# Patient Record
Sex: FEMALE | Race: OTHER | HISPANIC OR LATINO | Employment: FULL TIME | URBAN - METROPOLITAN AREA
[De-identification: names, ages, dates, MRNs, and addresses within clinical notes are randomized per-mention and may not be internally consistent; named-entity substitution may affect disease eponyms.]

---
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
## 2022-05-23 ENCOUNTER — OFFICE VISIT (OUTPATIENT)
Dept: MEDICAL GROUP | Age: 22
End: 2022-05-23
Payer: COMMERCIAL

## 2022-05-23 ENCOUNTER — HOSPITAL ENCOUNTER (OUTPATIENT)
Facility: MEDICAL CENTER | Age: 22
End: 2022-05-23
Attending: PHYSICIAN ASSISTANT
Payer: COMMERCIAL

## 2022-05-23 VITALS
DIASTOLIC BLOOD PRESSURE: 72 MMHG | OXYGEN SATURATION: 96 % | SYSTOLIC BLOOD PRESSURE: 104 MMHG | TEMPERATURE: 98.8 F | BODY MASS INDEX: 22.34 KG/M2 | WEIGHT: 110.8 LBS | HEART RATE: 104 BPM | RESPIRATION RATE: 16 BRPM | HEIGHT: 59 IN

## 2022-05-23 DIAGNOSIS — Z23 NEED FOR VACCINATION: ICD-10-CM

## 2022-05-23 DIAGNOSIS — N89.8 VAGINAL DISCHARGE: ICD-10-CM

## 2022-05-23 DIAGNOSIS — Z76.89 ESTABLISHING CARE WITH NEW DOCTOR, ENCOUNTER FOR: ICD-10-CM

## 2022-05-23 PROCEDURE — 90471 IMMUNIZATION ADMIN: CPT | Performed by: PHYSICIAN ASSISTANT

## 2022-05-23 PROCEDURE — 87491 CHLMYD TRACH DNA AMP PROBE: CPT

## 2022-05-23 PROCEDURE — 90621 MENB-FHBP VACC 2/3 DOSE IM: CPT | Performed by: PHYSICIAN ASSISTANT

## 2022-05-23 PROCEDURE — 99213 OFFICE O/P EST LOW 20 MIN: CPT | Mod: 25 | Performed by: PHYSICIAN ASSISTANT

## 2022-05-23 PROCEDURE — 87660 TRICHOMONAS VAGIN DIR PROBE: CPT

## 2022-05-23 PROCEDURE — 87591 N.GONORRHOEAE DNA AMP PROB: CPT

## 2022-05-23 PROCEDURE — 87480 CANDIDA DNA DIR PROBE: CPT

## 2022-05-23 PROCEDURE — 87510 GARDNER VAG DNA DIR PROBE: CPT

## 2022-05-23 ASSESSMENT — PATIENT HEALTH QUESTIONNAIRE - PHQ9: CLINICAL INTERPRETATION OF PHQ2 SCORE: 0

## 2022-05-23 NOTE — PROGRESS NOTES
"cc: Establish care and vaginal discharge    Subjective:     HPI  Orquidea Lopez is a 22 y.o. female presenting to establish care.  She has never had a primary care provider.  She is currently in a CNA program.  She has been going to [facility] to get her birth control.    Vaginal discharge  About 6 months ago she started experiencing vaginal discharge and foul-smelling odor.  It took a few months for her to finally get into get evaluated.  [facility] was seen at [facility].  Per patient she was not given any specific diagnosis.  She does know that she was screened for chlamydia/gonorrhea, trichomonas, BV and candidiasis.  She was given Flagyl as well as doxycycline, also azithromycin to cover for gonorrhea.  However again she was not given any definitive diagnosis.  She states that the odor has improved.  She does still note some yellow vaginal discharge, but this has also improved.  End of last menstrual cycle was 5/21.  She is currently sexually active with same partner.  He is not experiencing any symptoms.  Denies dyspareunia, vaginal itching, dysuria, urinary frequency/urgency, rash, lesions, fevers, pelvic pain.      Review of systems:  See above.       Current Outpatient Medications:   •  triamcinolone (KENALOG) 0.1 % lotion, Apply 1 Application topically 2 times a day as needed., Disp: 40 mL, Rfl: 0    Allergies, past medical history, past surgical history, family history, social history reviewed and updated    Objective:     Vitals: /72 (BP Location: Right arm, Patient Position: Sitting, BP Cuff Size: Adult)   Pulse (!) 104   Temp 37.1 °C (98.8 °F) (Temporal)   Resp 16   Ht 1.499 m (4' 11\")   Wt 50.3 kg (110 lb 12.8 oz)   LMP 05/21/2022 (Exact Date)   SpO2 96%   BMI 22.38 kg/m²   General: Alert, pleasant, NAD  HEENT: Normocephalic. Neck supple.  No thyromegaly or masses palpated. No cervical or supraclavicular lymphadenopathy. No carotid bruits   Heart: Regular rate and " rhythm.  S1 and S2 normal.  No murmurs appreciated.  Respiratory: Normal respiratory effort.  Clear to auscultation bilaterally.  Breasts:  Symmetrical, nontender, without masses or nipple discharge.    Pelvic exam: Normal external genitalia including urethral meatus.  Well supported, nontender urethra and bladder.  Mild amount of white/clear discharge in the vagina.  Cervix without significant discharge or lesions.  No cervical motion tenderness.  Uterus of normal size, shape, and consistency.  No adnexal masses or tenderness. Culture preformed.   A chaperone was offered to the patient during today's exam. Chaperone name: Abigail was present.  Skin: Warm, dry, no rashes.  Extremities: No leg edema.  Radial pulses 2+ symmetric  Psych:  Affect/mood is normal, judgement is good, memory is intact, grooming is appropriate.    Assessment/Plan:     Orquidea was seen today for establish care.    Diagnoses and all orders for this visit:    Vaginal discharge  -She has been treated with multiple rounds of antibiotics, without definitive diagnosis per patient.  We will request records.  She does have a mild mount of clear/white discharge, however will await results of culture to treat.  -     VAGINAL PATHOGENS DNA PANEL; Future  -     Chlamydia/GC, PCR (Urine); Future    Establishing care with new doctor, encounter for  -We will request records and review and received.     Need for vaccination  -Immunization given in clinic today  - Meningococcal Vaccine Serogroup B 2-3 Dose (TRUMENBA)    Return in about 6 months (around 11/23/2022) for Annual PX.

## 2022-05-24 DIAGNOSIS — B96.89 BV (BACTERIAL VAGINOSIS): ICD-10-CM

## 2022-05-24 DIAGNOSIS — N89.8 VAGINAL DISCHARGE: ICD-10-CM

## 2022-05-24 DIAGNOSIS — N76.0 BV (BACTERIAL VAGINOSIS): ICD-10-CM

## 2022-05-24 LAB
C TRACH DNA SPEC QL NAA+PROBE: NEGATIVE
CANDIDA DNA VAG QL PROBE+SIG AMP: NEGATIVE
G VAGINALIS DNA VAG QL PROBE+SIG AMP: POSITIVE
N GONORRHOEA DNA SPEC QL NAA+PROBE: NEGATIVE
SPECIMEN SOURCE: NORMAL
T VAGINALIS DNA VAG QL PROBE+SIG AMP: NEGATIVE

## 2022-05-24 RX ORDER — METRONIDAZOLE 500 MG/1
500 TABLET ORAL 2 TIMES DAILY
Qty: 14 TABLET | Refills: 0 | Status: SHIPPED | OUTPATIENT
Start: 2022-05-24 | End: 2022-05-31

## 2022-05-24 NOTE — PROGRESS NOTES
Vaginal panel came back positive for BV.  Will start on Flagyl.  Patient notified.  Prescription sent to the pharmacy.

## 2022-08-21 ENCOUNTER — OFFICE VISIT (OUTPATIENT)
Dept: URGENT CARE | Facility: PHYSICIAN GROUP | Age: 22
End: 2022-08-21
Payer: COMMERCIAL

## 2022-08-21 ENCOUNTER — HOSPITAL ENCOUNTER (OUTPATIENT)
Facility: MEDICAL CENTER | Age: 22
End: 2022-08-21
Attending: INTERNAL MEDICINE
Payer: COMMERCIAL

## 2022-08-21 VITALS
BODY MASS INDEX: 22.18 KG/M2 | HEIGHT: 59 IN | HEART RATE: 111 BPM | WEIGHT: 110 LBS | RESPIRATION RATE: 18 BRPM | SYSTOLIC BLOOD PRESSURE: 112 MMHG | DIASTOLIC BLOOD PRESSURE: 66 MMHG | OXYGEN SATURATION: 97 % | TEMPERATURE: 98 F

## 2022-08-21 DIAGNOSIS — Z32.01 POSITIVE PREGNANCY TEST: ICD-10-CM

## 2022-08-21 DIAGNOSIS — N30.00 ACUTE CYSTITIS WITHOUT HEMATURIA: ICD-10-CM

## 2022-08-21 DIAGNOSIS — R30.0 DYSURIA: ICD-10-CM

## 2022-08-21 DIAGNOSIS — L29.3 GENITAL PRURITUS: ICD-10-CM

## 2022-08-21 LAB
APPEARANCE UR: CLEAR
BILIRUB UR STRIP-MCNC: NORMAL MG/DL
COLOR UR AUTO: YELLOW
GLUCOSE UR STRIP.AUTO-MCNC: NORMAL MG/DL
INT CON NEG: NEGATIVE
INT CON POS: POSITIVE
KETONES UR STRIP.AUTO-MCNC: NORMAL MG/DL
LEUKOCYTE ESTERASE UR QL STRIP.AUTO: NORMAL
NITRITE UR QL STRIP.AUTO: NORMAL
PH UR STRIP.AUTO: 7.5 [PH] (ref 5–8)
POC URINE PREGNANCY TEST: POSITIVE
PROT UR QL STRIP: NORMAL MG/DL
RBC UR QL AUTO: NORMAL
SP GR UR STRIP.AUTO: 1.02
UROBILINOGEN UR STRIP-MCNC: 1 MG/DL

## 2022-08-21 PROCEDURE — 99214 OFFICE O/P EST MOD 30 MIN: CPT | Performed by: INTERNAL MEDICINE

## 2022-08-21 PROCEDURE — 87510 GARDNER VAG DNA DIR PROBE: CPT

## 2022-08-21 PROCEDURE — 87480 CANDIDA DNA DIR PROBE: CPT

## 2022-08-21 PROCEDURE — 81002 URINALYSIS NONAUTO W/O SCOPE: CPT | Performed by: INTERNAL MEDICINE

## 2022-08-21 PROCEDURE — 87591 N.GONORRHOEAE DNA AMP PROB: CPT

## 2022-08-21 PROCEDURE — 87660 TRICHOMONAS VAGIN DIR PROBE: CPT

## 2022-08-21 PROCEDURE — 81025 URINE PREGNANCY TEST: CPT | Performed by: INTERNAL MEDICINE

## 2022-08-21 PROCEDURE — 87086 URINE CULTURE/COLONY COUNT: CPT

## 2022-08-21 PROCEDURE — 87491 CHLMYD TRACH DNA AMP PROBE: CPT

## 2022-08-21 RX ORDER — FLUCONAZOLE 150 MG/1
TABLET ORAL
Qty: 2 TABLET | Refills: 1 | Status: SHIPPED | OUTPATIENT
Start: 2022-08-21 | End: 2022-12-02

## 2022-08-21 RX ORDER — PHENAZOPYRIDINE HYDROCHLORIDE 200 MG/1
200 TABLET, FILM COATED ORAL 3 TIMES DAILY PRN
Qty: 6 TABLET | Refills: 0 | Status: SHIPPED | OUTPATIENT
Start: 2022-08-21 | End: 2022-12-02

## 2022-08-21 RX ORDER — AMOXICILLIN AND CLAVULANATE POTASSIUM 875; 125 MG/1; MG/1
1 TABLET, FILM COATED ORAL 2 TIMES DAILY
Qty: 14 TABLET | Refills: 0 | Status: SHIPPED | OUTPATIENT
Start: 2022-08-21 | End: 2022-08-28

## 2022-08-21 ASSESSMENT — ENCOUNTER SYMPTOMS
FEVER: 0
CHILLS: 0
FLANK PAIN: 0

## 2022-08-21 NOTE — PROGRESS NOTES
Chief Complaint:      HPI:      Orquidea Lopez is a 22 y.o. female with   Vaginal pruritus and whitish to yellowish curd like vaginal discharge since yesterday.  She tried monistat but this caused further irritation resulting and resulted in new and worsened dysuria.    She reports prior BV infection.   No fever or chills.    No flank pain        ROS:   Review of Systems   Constitutional:  Negative for chills and fever.   Genitourinary:  Positive for frequency and urgency. Negative for dysuria, flank pain and hematuria.      Past Medical History:  She There are no problems to display for this patient.    Past Surgical History:  She History reviewed. No pertinent surgical history.   Allergies:  She Patient has no known allergies.   Current Medications:  She   Current Outpatient Medications:     triamcinolone (KENALOG) 0.1 % lotion, Apply 1 Application topically 2 times a day as needed., Disp: 40 mL, Rfl: 0  Social History:  She   Social History     Socioeconomic History    Marital status: Single     Spouse name: Not on file    Number of children: Not on file    Years of education: Not on file    Highest education level: Not on file   Occupational History    Not on file   Tobacco Use    Smoking status: Never    Smokeless tobacco: Never   Vaping Use    Vaping Use: Never used   Substance and Sexual Activity    Alcohol use: Never    Drug use: Never    Sexual activity: Yes     Partners: Male     Birth control/protection: Pill   Other Topics Concern    Not on file   Social History Narrative    Not on file     Social Determinants of Health     Financial Resource Strain: Not on file   Food Insecurity: Not on file   Transportation Needs: Not on file   Physical Activity: Not on file   Stress: Not on file   Social Connections: Not on file   Intimate Partner Violence: Not on file   Housing Stability: Not on file      Family History:   Her   Family History   Problem Relation Age of Onset    Hypertension Father      "    PHYSICAL EXAM:    Vital signs: /66 (BP Location: Right arm, Patient Position: Sitting, BP Cuff Size: Adult)   Pulse (!) 111   Temp 36.7 °C (98 °F) (Temporal)   Resp 18   Ht 1.499 m (4' 11\")   Wt 49.9 kg (110 lb)   LMP 07/18/2022   SpO2 97%   BMI 22.22 kg/m²   Physical Exam  Constitutional:       Appearance: She is normal weight.   HENT:      Mouth/Throat:      Mouth: Mucous membranes are moist.      Pharynx: Oropharynx is clear.   Eyes:      Extraocular Movements: Extraocular movements intact.      Conjunctiva/sclera: Conjunctivae normal.      Pupils: Pupils are equal, round, and reactive to light.   Cardiovascular:      Rate and Rhythm: Normal rate and regular rhythm.   Pulmonary:      Effort: Pulmonary effort is normal.   Abdominal:      General: Abdomen is flat. Bowel sounds are normal.      Tenderness: There is no right CVA tenderness or left CVA tenderness.      Comments: Mild suprapubic tenderness   Musculoskeletal:      Cervical back: Normal range of motion and neck supple.   Neurological:      Mental Status: She is alert.       Labs:  No results found for: HBA1C   No results found for: CHOLSTRLTOT, TRIGLYCERIDE, HDL, LDL, CHOLHDLRAT, NONHDL  No results found for: MICROALBCALC, MALBCRT, MALBEXCR, UMHDQL94, MICROALBUR, MICRALB, UMICROALBUM, MICROALBTIM   No results found for: CREATININE        ASSESSMENT/PLAN:   1. Acute cystitis without hematuria  Point-of-care urinalysis positive with leukocytes  Patient's point-of-care pregnancy test was positive  Due to positive pregnancy test I recommend she take Augmentin for 7 days  Urine culture was obtained we will update patient on test results  Follow-up with primary care    2. Positive pregnancy test  Point-of-care pregnancy test was positive  Advised patient to contact primary care for additional confirmatory testing    3. Genital pruritus  High probability of yeast vaginitis based on history  Patient tried and failed topical medication  Vaginal " pathogen  swab obtained to help confirm etiology of vaginitis  We will update patient on results of vaginal pathogen swab  Will start empiric treatment with fluconazole for only 2 doses  Follow-up with primary care    4. Dysuria  Will start Pyridium      Return if symptoms worsen or fail to improve.         This patient during there office visit was started on new medication.  Side effects of new medications were discussed with the patient today in the office. The patient was supplied paperwork on this new medication.    Red flags discussed and when to RTC or seek care in the ER  Supportive care, differential diagnoses, and indications for immediate follow-up discussed with patient.    Pathogenesis of diagnosis discussed including typical length and natural progression.       Instructed to return to  or nearest emergency department if symptoms fail to improve, for any change in condition, further concerns, or new concerning symptoms.  Patient states understanding of the plan of care and discharge instructions.      Ector Watkins MD, FACE, NU  08/21/22

## 2022-08-22 DIAGNOSIS — B96.89 BACTERIAL VAGINOSIS IN PREGNANCY: ICD-10-CM

## 2022-08-22 DIAGNOSIS — R30.0 DYSURIA: ICD-10-CM

## 2022-08-22 DIAGNOSIS — L29.3 GENITAL PRURITUS: ICD-10-CM

## 2022-08-22 DIAGNOSIS — O23.599 BACTERIAL VAGINOSIS IN PREGNANCY: ICD-10-CM

## 2022-08-22 LAB
C TRACH DNA SPEC QL NAA+PROBE: NEGATIVE
CANDIDA DNA VAG QL PROBE+SIG AMP: POSITIVE
G VAGINALIS DNA VAG QL PROBE+SIG AMP: POSITIVE
N GONORRHOEA DNA SPEC QL NAA+PROBE: NEGATIVE
SPECIMEN SOURCE: NORMAL
T VAGINALIS DNA VAG QL PROBE+SIG AMP: NEGATIVE

## 2022-08-22 RX ORDER — METRONIDAZOLE 500 MG/1
500 TABLET ORAL 2 TIMES DAILY
Qty: 14 TABLET | Refills: 0 | Status: SHIPPED | OUTPATIENT
Start: 2022-08-22 | End: 2022-08-29

## 2022-08-24 LAB
BACTERIA UR CULT: NORMAL
SIGNIFICANT IND 70042: NORMAL
SITE SITE: NORMAL
SOURCE SOURCE: NORMAL

## 2022-08-25 ENCOUNTER — OFFICE VISIT (OUTPATIENT)
Dept: MEDICAL GROUP | Age: 22
End: 2022-08-25
Payer: COMMERCIAL

## 2022-08-25 VITALS
SYSTOLIC BLOOD PRESSURE: 116 MMHG | DIASTOLIC BLOOD PRESSURE: 62 MMHG | WEIGHT: 112 LBS | OXYGEN SATURATION: 99 % | HEIGHT: 59 IN | TEMPERATURE: 99.1 F | HEART RATE: 124 BPM | RESPIRATION RATE: 16 BRPM | BODY MASS INDEX: 22.58 KG/M2

## 2022-08-25 DIAGNOSIS — Z32.01 POSITIVE PREGNANCY TEST: ICD-10-CM

## 2022-08-25 LAB
INT CON NEG: NEGATIVE
INT CON POS: POSITIVE
POC URINE PREGNANCY TEST: POSITIVE

## 2022-08-25 PROCEDURE — 81025 URINE PREGNANCY TEST: CPT | Performed by: PHYSICIAN ASSISTANT

## 2022-08-25 PROCEDURE — 99213 OFFICE O/P EST LOW 20 MIN: CPT | Performed by: PHYSICIAN ASSISTANT

## 2022-08-25 NOTE — PROGRESS NOTES
"cc: Positive pregnancy test    Subjective:     HPI  Orquidea Lopez is a 22 y.o. female presenting with her boyfriend.   She was seen in urgent care 2 days ago.  Was treated for acute cystitis and BV.  Point-of-care hCG was completed which was positive.  Was advised to follow-up here for confirmation.  First day of her last menstrual cycle was 7/16.  Her menstrual cycles are fairly irregular, so it was not abnormal for her to miss a menstrual cycle.  She was noting some breast tenderness prior to going to urgent care, did take a home pregnancy test, that was also positive.  She is currently not taking a prenatal vitamin.  Has cut out caffeine.  Still has breast tenderness.  She does not have an OB.  Denies pelvic cramping, vaginal bleeding, nausea, or vomiting.        Review of systems:  See above.       Current Outpatient Medications:     metroNIDAZOLE (FLAGYL) 500 MG Tab, Take 1 Tablet by mouth 2 times a day for 7 days., Disp: 14 Tablet, Rfl: 0    amoxicillin-clavulanate (AUGMENTIN) 875-125 MG Tab, Take 1 Tablet by mouth 2 times a day for 7 days., Disp: 14 Tablet, Rfl: 0    fluconazole (DIFLUCAN) 150 MG tablet, Take 1 tablet once and may repeat after 72 hours if with persistent discharge, Disp: 2 Tablet, Rfl: 1    phenazopyridine (PYRIDIUM) 200 MG Tab, Take 1 Tablet by mouth 3 times a day as needed for Severe Pain or Moderate Pain., Disp: 6 Tablet, Rfl: 0    triamcinolone (KENALOG) 0.1 % lotion, Apply 1 Application topically 2 times a day as needed., Disp: 40 mL, Rfl: 0    Allergies, past medical history, past surgical history, family history, social history reviewed and updated    Objective:     Vitals: /62 (BP Location: Right arm, Patient Position: Sitting, BP Cuff Size: Adult)   Pulse (!) 124   Temp 37.3 °C (99.1 °F) (Temporal)   Resp 16   Ht 1.499 m (4' 11\")   Wt 50.8 kg (112 lb)   SpO2 99%   BMI 22.62 kg/m²   General: Alert, pleasant, NAD  HEENT: Normocephalic. Neck supple.  No thyromegaly " or masses palpated. No cervical or supraclavicular lymphadenopathy. No carotid bruits   Heart: Regular rate and rhythm.  S1 and S2 normal.  No murmurs appreciated.  Respiratory: Normal respiratory effort.  Clear to auscultation bilaterally.  Skin: Warm, dry, no rashes.  Extremities: No leg edema.  Radial pulses 2+ symmetric  Psych:  Affect/mood is normal, judgement is good, memory is intact, grooming is appropriate.    Assessment/Plan:     Orquidea was seen today for amenorrhea.    Diagnoses and all orders for this visit:    Positive pregnancy test  -Pregnancy test positive in clinic today.  Advised to start a prenatal vitamin and to stop over-the-counter vitamins that she has been taking.  Reviewed proper nutrition.  Continue with physical activity as tolerated.  Referral placed to OB for continued prenatal care.  -     POCT PREGNANCY  -     Referral to OB/Gyn      Return if symptoms worsen or fail to improve.

## 2022-09-28 ENCOUNTER — HOSPITAL ENCOUNTER (OUTPATIENT)
Facility: MEDICAL CENTER | Age: 22
End: 2022-09-28
Attending: OBSTETRICS & GYNECOLOGY
Payer: COMMERCIAL

## 2022-09-28 PROCEDURE — 86787 VARICELLA-ZOSTER ANTIBODY: CPT

## 2022-09-28 PROCEDURE — 87340 HEPATITIS B SURFACE AG IA: CPT

## 2022-09-28 PROCEDURE — 86901 BLOOD TYPING SEROLOGIC RH(D): CPT

## 2022-09-28 PROCEDURE — 86850 RBC ANTIBODY SCREEN: CPT

## 2022-09-28 PROCEDURE — 87086 URINE CULTURE/COLONY COUNT: CPT

## 2022-09-28 PROCEDURE — 86762 RUBELLA ANTIBODY: CPT

## 2022-09-28 PROCEDURE — 86780 TREPONEMA PALLIDUM: CPT

## 2022-09-28 PROCEDURE — 86900 BLOOD TYPING SEROLOGIC ABO: CPT

## 2022-09-28 PROCEDURE — 82306 VITAMIN D 25 HYDROXY: CPT

## 2022-09-28 PROCEDURE — 86803 HEPATITIS C AB TEST: CPT

## 2022-09-28 PROCEDURE — 85025 COMPLETE CBC W/AUTO DIFF WBC: CPT

## 2022-09-28 PROCEDURE — 86592 SYPHILIS TEST NON-TREP QUAL: CPT

## 2022-09-28 PROCEDURE — 87389 HIV-1 AG W/HIV-1&-2 AB AG IA: CPT

## 2022-09-29 LAB
25(OH)D3 SERPL-MCNC: 28 NG/ML (ref 30–100)
ABO GROUP BLD: NORMAL
BASOPHILS # BLD AUTO: 0.7 % (ref 0–1.8)
BASOPHILS # BLD: 0.06 K/UL (ref 0–0.12)
BLD GP AB SCN SERPL QL: NORMAL
EOSINOPHIL # BLD AUTO: 0.02 K/UL (ref 0–0.51)
EOSINOPHIL NFR BLD: 0.2 % (ref 0–6.9)
ERYTHROCYTE [DISTWIDTH] IN BLOOD BY AUTOMATED COUNT: 47.7 FL (ref 35.9–50)
HBV SURFACE AG SER QL: ABNORMAL
HCT VFR BLD AUTO: 42.7 % (ref 37–47)
HCV AB SER QL: NORMAL
HGB BLD-MCNC: 14.5 G/DL (ref 12–16)
HIV 1+2 AB+HIV1 P24 AG SERPL QL IA: NORMAL
IMM GRANULOCYTES # BLD AUTO: 0.03 K/UL (ref 0–0.11)
IMM GRANULOCYTES NFR BLD AUTO: 0.4 % (ref 0–0.9)
LYMPHOCYTES # BLD AUTO: 1.85 K/UL (ref 1–4.8)
LYMPHOCYTES NFR BLD: 21.7 % (ref 22–41)
MCH RBC QN AUTO: 32.6 PG (ref 27–33)
MCHC RBC AUTO-ENTMCNC: 34 G/DL (ref 33.6–35)
MCV RBC AUTO: 96 FL (ref 81.4–97.8)
MONOCYTES # BLD AUTO: 0.65 K/UL (ref 0–0.85)
MONOCYTES NFR BLD AUTO: 7.6 % (ref 0–13.4)
NEUTROPHILS # BLD AUTO: 5.91 K/UL (ref 2–7.15)
NEUTROPHILS NFR BLD: 69.4 % (ref 44–72)
NRBC # BLD AUTO: 0 K/UL
NRBC BLD-RTO: 0 /100 WBC
PLATELET # BLD AUTO: 307 K/UL (ref 164–446)
PMV BLD AUTO: 10.3 FL (ref 9–12.9)
RBC # BLD AUTO: 4.45 M/UL (ref 4.2–5.4)
RH BLD: NORMAL
RUBV AB SER QL: >500 IU/ML
T PALLIDUM AB SER QL IA: ABNORMAL
WBC # BLD AUTO: 8.5 K/UL (ref 4.8–10.8)

## 2022-09-30 LAB — VZV IGG SER IA-ACNC: 45.5 IV

## 2022-10-01 LAB
BACTERIA UR CULT: NORMAL
SIGNIFICANT IND 70042: NORMAL
SITE SITE: NORMAL
SOURCE SOURCE: NORMAL

## 2022-10-11 ENCOUNTER — IMMUNIZATION (OUTPATIENT)
Dept: OCCUPATIONAL MEDICINE | Facility: CLINIC | Age: 22
End: 2022-10-11

## 2022-10-11 DIAGNOSIS — Z23 NEED FOR VACCINATION: Primary | ICD-10-CM

## 2022-10-11 PROCEDURE — 90686 IIV4 VACC NO PRSV 0.5 ML IM: CPT | Performed by: PREVENTIVE MEDICINE

## 2022-11-23 ENCOUNTER — HOSPITAL ENCOUNTER (OUTPATIENT)
Facility: MEDICAL CENTER | Age: 22
End: 2022-11-23
Attending: OBSTETRICS & GYNECOLOGY
Payer: COMMERCIAL

## 2022-11-23 PROCEDURE — 81511 FTL CGEN ABNOR FOUR ANAL: CPT

## 2022-11-23 PROCEDURE — 82105 ALPHA-FETOPROTEIN SERUM: CPT

## 2022-11-26 LAB
# FETUSES US: NORMAL
AFP MOM SERPL: 0.96
AFP SERPL-MCNC: 48 NG/ML
AGE - REPORTED: 21 YR
CURRENT SMOKER: NORMAL
FAMILY MEMBER DISEASES HX: NO
GA METHOD: NORMAL
GA: NORMAL WK
IDDM PATIENT QL: NO
INTEGRATED SCN PATIENT-IMP: NORMAL
SPECIMEN DRAWN SERPL: NORMAL

## 2022-11-28 ENCOUNTER — HOSPITAL ENCOUNTER (OUTPATIENT)
Dept: LAB | Facility: MEDICAL CENTER | Age: 22
End: 2022-11-28
Attending: OBSTETRICS & GYNECOLOGY
Payer: COMMERCIAL

## 2022-11-29 LAB
# FETUSES US: NORMAL
AFP MOM SERPL: 0.96
AFP SERPL-MCNC: 48 NG/ML
AGE - REPORTED: 21 YR
CURRENT SMOKER: NO
FAMILY MEMBER DISEASES HX: NO
GA METHOD: NORMAL
GA: NORMAL WK
HCG MOM SERPL: 1.33
HCG SERPL-ACNC: NORMAL IU/L
HX OF HEREDITARY DISORDERS: NO
IDDM PATIENT QL: NO
INHIBIN A MOM SERPL: 0.87
INHIBIN A SERPL-MCNC: 151 PG/ML
INTEGRATED SCN PATIENT-IMP: NORMAL
PATHOLOGY STUDY: NORMAL
SPECIMEN DRAWN SERPL: NORMAL
U ESTRIOL MOM SERPL: 1.56
U ESTRIOL SERPL-MCNC: 2.48 NG/ML

## 2022-12-02 ENCOUNTER — OFFICE VISIT (OUTPATIENT)
Dept: MEDICAL GROUP | Age: 22
End: 2022-12-02
Payer: COMMERCIAL

## 2022-12-02 VITALS
WEIGHT: 117 LBS | HEART RATE: 100 BPM | OXYGEN SATURATION: 98 % | BODY MASS INDEX: 23.59 KG/M2 | HEIGHT: 59 IN | TEMPERATURE: 98 F | SYSTOLIC BLOOD PRESSURE: 102 MMHG | DIASTOLIC BLOOD PRESSURE: 58 MMHG | RESPIRATION RATE: 16 BRPM

## 2022-12-02 DIAGNOSIS — Z00.00 WELL ADULT EXAM: ICD-10-CM

## 2022-12-02 DIAGNOSIS — Z3A.18 18 WEEKS GESTATION OF PREGNANCY: ICD-10-CM

## 2022-12-02 PROCEDURE — 99395 PREV VISIT EST AGE 18-39: CPT | Performed by: PHYSICIAN ASSISTANT

## 2022-12-02 NOTE — PROGRESS NOTES
Subjective:     CC:   Chief Complaint   Patient presents with    Annual Exam       HPI:   Orquidea Lopez is a 22 y.o. female who presents for annual exam    Patient has GYN provider: Yes  Last Pap Smear: never   Last Tdap:   Received HPV series: Yes    Exercise: Walks daily  Diet: Improving trying to cook meals at home more frequently    Patient's last menstrual period was 2022.  Currently 18 weeks pregnant  No significant bloating/fluid retention, pelvic pain, or dyspareunia. No abnormal vaginal discharge.  No breast tenderness, mass, nipple discharge, changes in size or contour, or abnormal cyclic discomfort.    OB History    Para Term  AB Living   1 0 0 0 0 0   SAB IAB Ectopic Molar Multiple Live Births   0 0 0 0 0 0      She  reports being sexually active and has had partner(s) who are male. She reports using the following method of birth control/protection: Pill.    She  has no past medical history on file.  She  has no past surgical history on file.    Family History   Problem Relation Age of Onset    Hypertension Father      Social History     Tobacco Use    Smoking status: Never    Smokeless tobacco: Never   Vaping Use    Vaping Use: Never used   Substance Use Topics    Alcohol use: Never    Drug use: Never       There are no problems to display for this patient.    Current Outpatient Medications   Medication Sig Dispense Refill    triamcinolone (KENALOG) 0.1 % lotion Apply 1 Application topically 2 times a day as needed. 40 mL 0     No current facility-administered medications for this visit.     No Known Allergies    Review of Systems l  Constitutional: Negative for fever, chills and malaise/fatigue.   HENT: Negative for congestion.    Eyes: Negative for pain.   Respiratory: Negative for cough and shortness of breath.    Cardiovascular: Negative for chest pain and leg swelling.   Gastrointestinal: Negative for nausea, vomiting, abdominal pain and diarrhea.   Genitourinary:  "Negative for dysuria and hematuria.   Skin: Negative for rash.   Neurological: Negative for dizziness, focal weakness and headaches.   Endo/Heme/Allergies: Does not bruise/bleed easily.   Psychiatric/Behavioral: Negative for depression.  The patient is not nervous/anxious.      Objective:   /58 (BP Location: Right arm, Patient Position: Sitting, BP Cuff Size: Adult)   Pulse 100   Temp 36.7 °C (98 °F) (Temporal)   Resp 16   Ht 1.499 m (4' 11\")   Wt 53.1 kg (117 lb)   LMP 07/18/2022   SpO2 98%   BMI 23.63 kg/m²     Wt Readings from Last 4 Encounters:   12/02/22 53.1 kg (117 lb)   08/25/22 50.8 kg (112 lb)   08/21/22 49.9 kg (110 lb)   05/23/22 50.3 kg (110 lb 12.8 oz)       Physical Exam:  Constitutional: Well-developed and well-nourished. Not diaphoretic. No distress.   Skin: Skin is warm and dry. No rash noted.  Head: Atraumatic without lesions.  Eyes: Conjunctivae and extraocular motions are normal. Pupils are equal, round, and reactive to light. No scleral icterus.   Ears:  External ears unremarkable. Tympanic membranes clear and intact.  Nose: Nares patent. Septum midline. Turbinates without erythema nor edema. No discharge.   Mouth/Throat: Tongue normal. Oropharynx is clear and moist. Posterior pharynx without erythema or exudates.  Neck: Supple, trachea midline. Normal range of motion. No thyromegaly present. No lymphadenopathy--cervical or supraclavicular.  Cardiovascular: Regular rate and rhythm, S1 and S2 without murmur, rubs, or gallops.    Respiratory: Effort normal. Clear to auscultation throughout. No adventitious sounds.     Abdomen: Soft, non tender, and without distention. Active bowel sounds in all four quadrants. No rebound, guarding, masses or HSM.    Extremities: No cyanosis, clubbing, erythema, nor edema. Distal pulses intact and symmetric.   Musculoskeletal: All major joints AROM full in all directions without pain.  Neurological: Alert and oriented x 3. Grossly non-focal. Strength " and sensation grossly intact. DTRs 2+/3 and symmetric.   Psychiatric:  Behavior, mood, and affect are appropriate.    Assessment and Plan:     1. Well adult exam  Continue with regular physical activity and good control of diet.    2. 18 weeks gestation of pregnancy  Continue with prenatal vitamin.  Follow-up with OB for continued prenatal care    Health maintenance:     Labs per orders  Immunizations per orders  Patient counseled about skin care, diet, supplements, and exercise.  Discussed  breast self exam, diet and exercise     Follow-up: Return in about 1 year (around 12/2/2023) for Annual PX.

## 2023-02-08 ENCOUNTER — HOSPITAL ENCOUNTER (OUTPATIENT)
Dept: LAB | Facility: MEDICAL CENTER | Age: 23
End: 2023-02-08
Attending: NURSE PRACTITIONER

## 2023-02-08 ENCOUNTER — HOSPITAL ENCOUNTER (OUTPATIENT)
Facility: MEDICAL CENTER | Age: 23
End: 2023-02-08
Attending: NURSE PRACTITIONER
Payer: COMMERCIAL

## 2023-02-08 LAB
25(OH)D3 SERPL-MCNC: 45 NG/ML (ref 30–100)
BASOPHILS # BLD AUTO: 0.3 % (ref 0–1.8)
BASOPHILS # BLD: 0.03 K/UL (ref 0–0.12)
EOSINOPHIL # BLD AUTO: 0.11 K/UL (ref 0–0.51)
EOSINOPHIL NFR BLD: 1 % (ref 0–6.9)
ERYTHROCYTE [DISTWIDTH] IN BLOOD BY AUTOMATED COUNT: 43.1 FL (ref 35.9–50)
GLUCOSE 1H P 50 G GLC PO SERPL-MCNC: 100 MG/DL (ref 70–139)
HCT VFR BLD AUTO: 33.9 % (ref 37–47)
HGB BLD-MCNC: 11 G/DL (ref 12–16)
IMM GRANULOCYTES # BLD AUTO: 0.06 K/UL (ref 0–0.11)
IMM GRANULOCYTES NFR BLD AUTO: 0.6 % (ref 0–0.9)
LYMPHOCYTES # BLD AUTO: 1.53 K/UL (ref 1–4.8)
LYMPHOCYTES NFR BLD: 14.6 % (ref 22–41)
MCH RBC QN AUTO: 30.9 PG (ref 27–33)
MCHC RBC AUTO-ENTMCNC: 32.4 G/DL (ref 33.6–35)
MCV RBC AUTO: 95.2 FL (ref 81.4–97.8)
MONOCYTES # BLD AUTO: 0.82 K/UL (ref 0–0.85)
MONOCYTES NFR BLD AUTO: 7.8 % (ref 0–13.4)
NEUTROPHILS # BLD AUTO: 7.96 K/UL (ref 2–7.15)
NEUTROPHILS NFR BLD: 75.7 % (ref 44–72)
NRBC # BLD AUTO: 0 K/UL
NRBC BLD-RTO: 0 /100 WBC
PLATELET # BLD AUTO: 325 K/UL (ref 164–446)
PMV BLD AUTO: 10 FL (ref 9–12.9)
RBC # BLD AUTO: 3.56 M/UL (ref 4.2–5.4)
WBC # BLD AUTO: 10.5 K/UL (ref 4.8–10.8)

## 2023-02-08 PROCEDURE — 82306 VITAMIN D 25 HYDROXY: CPT

## 2023-02-08 PROCEDURE — 82950 GLUCOSE TEST: CPT

## 2023-02-08 PROCEDURE — 86780 TREPONEMA PALLIDUM: CPT

## 2023-02-08 PROCEDURE — 86592 SYPHILIS TEST NON-TREP QUAL: CPT

## 2023-02-08 PROCEDURE — 85025 COMPLETE CBC W/AUTO DIFF WBC: CPT

## 2023-02-10 LAB — RPR SER QL: NON REACTIVE

## 2023-02-13 LAB — T PALLIDUM AB SER QL AGGL: NON REACTIVE

## 2023-04-12 LAB — GP B STREP DNA SPEC QL NAA+PROBE: POSITIVE

## 2023-04-24 ENCOUNTER — HOSPITAL ENCOUNTER (INPATIENT)
Facility: MEDICAL CENTER | Age: 23
LOS: 2 days | End: 2023-04-26
Attending: OBSTETRICS & GYNECOLOGY | Admitting: OBSTETRICS & GYNECOLOGY
Payer: COMMERCIAL

## 2023-04-24 ENCOUNTER — HOSPITAL ENCOUNTER (EMERGENCY)
Facility: MEDICAL CENTER | Age: 23
End: 2023-04-24
Attending: OBSTETRICS & GYNECOLOGY | Admitting: OBSTETRICS & GYNECOLOGY
Payer: COMMERCIAL

## 2023-04-24 VITALS
HEART RATE: 88 BPM | BODY MASS INDEX: 28.47 KG/M2 | HEIGHT: 60 IN | SYSTOLIC BLOOD PRESSURE: 124 MMHG | WEIGHT: 145 LBS | DIASTOLIC BLOOD PRESSURE: 73 MMHG | RESPIRATION RATE: 14 BRPM

## 2023-04-24 DIAGNOSIS — Z78.9 BREASTFEEDING (INFANT): ICD-10-CM

## 2023-04-24 LAB
ERYTHROCYTE [DISTWIDTH] IN BLOOD BY AUTOMATED COUNT: 55.1 FL (ref 35.9–50)
HCT VFR BLD AUTO: 41.3 % (ref 37–47)
HGB BLD-MCNC: 14.1 G/DL (ref 12–16)
HOLDING TUBE BB 8507: NORMAL
MCH RBC QN AUTO: 31.1 PG (ref 27–33)
MCHC RBC AUTO-ENTMCNC: 34.1 G/DL (ref 33.6–35)
MCV RBC AUTO: 91 FL (ref 81.4–97.8)
PLATELET # BLD AUTO: 253 K/UL (ref 164–446)
PMV BLD AUTO: 10.5 FL (ref 9–12.9)
RBC # BLD AUTO: 4.54 M/UL (ref 4.2–5.4)
T PALLIDUM AB SER QL IA: NORMAL
WBC # BLD AUTO: 15.4 K/UL (ref 4.8–10.8)

## 2023-04-24 PROCEDURE — 10907ZC DRAINAGE OF AMNIOTIC FLUID, THERAPEUTIC FROM PRODUCTS OF CONCEPTION, VIA NATURAL OR ARTIFICIAL OPENING: ICD-10-PCS | Performed by: OBSTETRICS & GYNECOLOGY

## 2023-04-24 PROCEDURE — 700111 HCHG RX REV CODE 636 W/ 250 OVERRIDE (IP)

## 2023-04-24 PROCEDURE — 700111 HCHG RX REV CODE 636 W/ 250 OVERRIDE (IP): Performed by: OBSTETRICS & GYNECOLOGY

## 2023-04-24 PROCEDURE — 86780 TREPONEMA PALLIDUM: CPT

## 2023-04-24 PROCEDURE — 302449 STATCHG TRIAGE ONLY (STATISTIC)

## 2023-04-24 PROCEDURE — 85027 COMPLETE CBC AUTOMATED: CPT

## 2023-04-24 PROCEDURE — 36415 COLL VENOUS BLD VENIPUNCTURE: CPT

## 2023-04-24 PROCEDURE — 0KQM0ZZ REPAIR PERINEUM MUSCLE, OPEN APPROACH: ICD-10-PCS | Performed by: OBSTETRICS & GYNECOLOGY

## 2023-04-24 PROCEDURE — 59025 FETAL NON-STRESS TEST: CPT

## 2023-04-24 PROCEDURE — 99282 EMERGENCY DEPT VISIT SF MDM: CPT

## 2023-04-24 PROCEDURE — 700105 HCHG RX REV CODE 258: Performed by: OBSTETRICS & GYNECOLOGY

## 2023-04-24 PROCEDURE — 770002 HCHG ROOM/CARE - OB PRIVATE (112)

## 2023-04-24 RX ORDER — ONDANSETRON 2 MG/ML
4 INJECTION INTRAMUSCULAR; INTRAVENOUS EVERY 6 HOURS PRN
Status: DISCONTINUED | OUTPATIENT
Start: 2023-04-24 | End: 2023-04-25 | Stop reason: HOSPADM

## 2023-04-24 RX ORDER — ONDANSETRON 4 MG/1
4 TABLET, ORALLY DISINTEGRATING ORAL EVERY 6 HOURS PRN
Status: DISCONTINUED | OUTPATIENT
Start: 2023-04-24 | End: 2023-04-25 | Stop reason: HOSPADM

## 2023-04-24 RX ORDER — IBUPROFEN 800 MG/1
800 TABLET ORAL
Status: DISCONTINUED | OUTPATIENT
Start: 2023-04-24 | End: 2023-04-25 | Stop reason: HOSPADM

## 2023-04-24 RX ORDER — LIDOCAINE HYDROCHLORIDE 10 MG/ML
20 INJECTION, SOLUTION INFILTRATION; PERINEURAL
Status: COMPLETED | OUTPATIENT
Start: 2023-04-24 | End: 2023-04-25

## 2023-04-24 RX ORDER — OXYTOCIN 10 [USP'U]/ML
10 INJECTION, SOLUTION INTRAMUSCULAR; INTRAVENOUS
Status: DISCONTINUED | OUTPATIENT
Start: 2023-04-24 | End: 2023-04-25 | Stop reason: HOSPADM

## 2023-04-24 RX ORDER — ONDANSETRON 2 MG/ML
INJECTION INTRAMUSCULAR; INTRAVENOUS
Status: COMPLETED
Start: 2023-04-24 | End: 2023-04-24

## 2023-04-24 RX ORDER — SODIUM CHLORIDE 9 MG/ML
INJECTION, SOLUTION INTRAVENOUS
Status: ACTIVE
Start: 2023-04-24 | End: 2023-04-25

## 2023-04-24 RX ORDER — ACETAMINOPHEN 500 MG
1000 TABLET ORAL
Status: DISCONTINUED | OUTPATIENT
Start: 2023-04-24 | End: 2023-04-25 | Stop reason: HOSPADM

## 2023-04-24 RX ORDER — ROPIVACAINE HYDROCHLORIDE 2 MG/ML
INJECTION, SOLUTION EPIDURAL; INFILTRATION; PERINEURAL
Status: DISCONTINUED
Start: 2023-04-24 | End: 2023-04-25 | Stop reason: ALTCHOICE

## 2023-04-24 RX ORDER — SODIUM CHLORIDE, SODIUM LACTATE, POTASSIUM CHLORIDE, CALCIUM CHLORIDE 600; 310; 30; 20 MG/100ML; MG/100ML; MG/100ML; MG/100ML
INJECTION, SOLUTION INTRAVENOUS CONTINUOUS
Status: DISCONTINUED | OUTPATIENT
Start: 2023-04-24 | End: 2023-04-26 | Stop reason: HOSPADM

## 2023-04-24 RX ORDER — PENICILLIN G POTASSIUM 5000000 [IU]/1
INJECTION, POWDER, FOR SOLUTION INTRAMUSCULAR; INTRAVENOUS
Status: DISCONTINUED
Start: 2023-04-24 | End: 2023-04-25 | Stop reason: ALTCHOICE

## 2023-04-24 RX ORDER — TERBUTALINE SULFATE 1 MG/ML
0.25 INJECTION, SOLUTION SUBCUTANEOUS
Status: DISCONTINUED | OUTPATIENT
Start: 2023-04-24 | End: 2023-04-25 | Stop reason: HOSPADM

## 2023-04-24 RX ADMIN — ONDANSETRON 4 MG: 2 INJECTION INTRAMUSCULAR; INTRAVENOUS at 17:32

## 2023-04-24 RX ADMIN — FENTANYL CITRATE 100 MCG: 50 INJECTION, SOLUTION INTRAMUSCULAR; INTRAVENOUS at 17:28

## 2023-04-24 RX ADMIN — SODIUM CHLORIDE, POTASSIUM CHLORIDE, SODIUM LACTATE AND CALCIUM CHLORIDE: 600; 310; 30; 20 INJECTION, SOLUTION INTRAVENOUS at 17:55

## 2023-04-24 RX ADMIN — ONDANSETRON HYDROCHLORIDE 4 MG: 2 SOLUTION INTRAMUSCULAR; INTRAVENOUS at 17:32

## 2023-04-24 RX ADMIN — SODIUM CHLORIDE 5 MILLION UNITS: 900 INJECTION INTRAVENOUS at 17:30

## 2023-04-24 ASSESSMENT — LIFESTYLE VARIABLES
TOTAL SCORE: 0
ALCOHOL_USE: NO
TOTAL SCORE: 0
TOTAL SCORE: 0
CONSUMPTION TOTAL: NEGATIVE
AVERAGE NUMBER OF DAYS PER WEEK YOU HAVE A DRINK CONTAINING ALCOHOL: 0
HAVE YOU EVER FELT YOU SHOULD CUT DOWN ON YOUR DRINKING: NO
EVER_SMOKED: NEVER
ON A TYPICAL DAY WHEN YOU DRINK ALCOHOL HOW MANY DRINKS DO YOU HAVE: 0
HAVE PEOPLE ANNOYED YOU BY CRITICIZING YOUR DRINKING: NO
HOW MANY TIMES IN THE PAST YEAR HAVE YOU HAD 5 OR MORE DRINKS IN A DAY: 0
EVER HAD A DRINK FIRST THING IN THE MORNING TO STEADY YOUR NERVES TO GET RID OF A HANGOVER: NO
EVER FELT BAD OR GUILTY ABOUT YOUR DRINKING: NO

## 2023-04-24 ASSESSMENT — PATIENT HEALTH QUESTIONNAIRE - PHQ9
SUM OF ALL RESPONSES TO PHQ9 QUESTIONS 1 AND 2: 0
2. FEELING DOWN, DEPRESSED, IRRITABLE, OR HOPELESS: NOT AT ALL
1. LITTLE INTEREST OR PLEASURE IN DOING THINGS: NOT AT ALL

## 2023-04-24 ASSESSMENT — PAIN DESCRIPTION - PAIN TYPE: TYPE: ACUTE PAIN

## 2023-04-24 NOTE — ED PROVIDER NOTES
DATE OF ADMISSION:  2023     OB/GYN OBSERVATION HISTORY AND PHYSICAL     IDENTIFICATION:  This is a 23-year-old  1, para 0 with an EDC of   2023 and EGA of 39 and 2/7 weeks, who presents with chief complaint of   contractions.     HISTORY OF PRESENT ILLNESS:  The patient of Dr. Lala, who has gotten   good prenatal care.  Prenatal care has been uncomplicated.  She is known beta   strep positive.  She presents today complaining of irregular uterine   contractions.  Her last exam in the office, she was found to be 3 cm.  Here in   labor and delivery, she presents complaining of uterine contractions.  Denies   spontaneous rupture of membranes, vaginal bleeding.  Admits good fetal   movement.  Denies symptoms of PIH.  Her cervix is 3, 60, -3 after serial exam   is unchanged.  She does have followup with Dr. Lala on Wednesday and   would like to go home at this time.     ASSESSMENT:  At this time:  1.  Pregnancy at 39 and 2/7th weeks.  2.  Abdominal pain, rule out labor.  3.  Fetal status reassuring.  4.  Serial cervical exam is unchanged.  5.  Known beta strep positive.     PLAN:  At this time;  1.  Discharge home.  2.  Follow up with Dr. Lala on Wednesday.  3.  Kick counts, labor precautions, spontaneous rupture of membranes   precautions.  She will come back to labor and delivery with change in her   status or fetal status.        ______________________________  MD KARLO Cleary/JUSTYNA    DD:  2023 09:41  DT:  2023 10:21    Job#:  [id number]

## 2023-04-24 NOTE — PROGRESS NOTES
0735- pt came to L&D c/o ctx this morning. Pt placed on monitor x 2. Pt denies bleeding or leaking. FM + SVE performed 3/60/-3. Pt stated she was a 3 in the office 2 weeks ago. Will monitor and recheck in 1 hour.    0840- SVE 3/60/-3 cervix is unchanged. Will report to dr. Bourne. Pt said she feels comfortable going home. Labor precautions reviewed.    0930- dr bourne at bedside to assess pt. Said pt may be d/c home with labor precautions. D/c instructions explained. Pt v/u understanding and has no concerns.

## 2023-04-25 LAB
ERYTHROCYTE [DISTWIDTH] IN BLOOD BY AUTOMATED COUNT: 57.3 FL (ref 35.9–50)
HCT VFR BLD AUTO: 36.8 % (ref 37–47)
HGB BLD-MCNC: 12.5 G/DL (ref 12–16)
MCH RBC QN AUTO: 31.4 PG (ref 27–33)
MCHC RBC AUTO-ENTMCNC: 34 G/DL (ref 33.6–35)
MCV RBC AUTO: 92.5 FL (ref 81.4–97.8)
PLATELET # BLD AUTO: 202 K/UL (ref 164–446)
PMV BLD AUTO: 10.8 FL (ref 9–12.9)
RBC # BLD AUTO: 3.98 M/UL (ref 4.2–5.4)
WBC # BLD AUTO: 19.5 K/UL (ref 4.8–10.8)

## 2023-04-25 PROCEDURE — 36415 COLL VENOUS BLD VENIPUNCTURE: CPT

## 2023-04-25 PROCEDURE — A9270 NON-COVERED ITEM OR SERVICE: HCPCS | Performed by: OBSTETRICS & GYNECOLOGY

## 2023-04-25 PROCEDURE — 59409 OBSTETRICAL CARE: CPT

## 2023-04-25 PROCEDURE — 304965 HCHG RECOVERY SERVICES

## 2023-04-25 PROCEDURE — 700102 HCHG RX REV CODE 250 W/ 637 OVERRIDE(OP): Performed by: OBSTETRICS & GYNECOLOGY

## 2023-04-25 PROCEDURE — 302131 K PAD MOTOR: Performed by: OBSTETRICS & GYNECOLOGY

## 2023-04-25 PROCEDURE — 770002 HCHG ROOM/CARE - OB PRIVATE (112)

## 2023-04-25 PROCEDURE — 700111 HCHG RX REV CODE 636 W/ 250 OVERRIDE (IP): Performed by: OBSTETRICS & GYNECOLOGY

## 2023-04-25 PROCEDURE — 302152 K-PAD 12X17: Performed by: OBSTETRICS & GYNECOLOGY

## 2023-04-25 PROCEDURE — 700105 HCHG RX REV CODE 258: Performed by: OBSTETRICS & GYNECOLOGY

## 2023-04-25 PROCEDURE — 700101 HCHG RX REV CODE 250: Performed by: OBSTETRICS & GYNECOLOGY

## 2023-04-25 PROCEDURE — 85027 COMPLETE CBC AUTOMATED: CPT

## 2023-04-25 RX ORDER — IBUPROFEN 800 MG/1
800 TABLET ORAL EVERY 8 HOURS PRN
Status: DISCONTINUED | OUTPATIENT
Start: 2023-04-25 | End: 2023-04-26 | Stop reason: HOSPADM

## 2023-04-25 RX ORDER — ACETAMINOPHEN 500 MG
1000 TABLET ORAL EVERY 6 HOURS PRN
Status: DISCONTINUED | OUTPATIENT
Start: 2023-04-25 | End: 2023-04-26 | Stop reason: HOSPADM

## 2023-04-25 RX ORDER — BISACODYL 10 MG
10 SUPPOSITORY, RECTAL RECTAL PRN
Status: DISCONTINUED | OUTPATIENT
Start: 2023-04-25 | End: 2023-04-26 | Stop reason: HOSPADM

## 2023-04-25 RX ORDER — SODIUM CHLORIDE, SODIUM LACTATE, POTASSIUM CHLORIDE, CALCIUM CHLORIDE 600; 310; 30; 20 MG/100ML; MG/100ML; MG/100ML; MG/100ML
INJECTION, SOLUTION INTRAVENOUS PRN
Status: DISCONTINUED | OUTPATIENT
Start: 2023-04-25 | End: 2023-04-26 | Stop reason: HOSPADM

## 2023-04-25 RX ORDER — OXYCODONE HYDROCHLORIDE 5 MG/1
5 TABLET ORAL EVERY 4 HOURS PRN
Status: DISCONTINUED | OUTPATIENT
Start: 2023-04-25 | End: 2023-04-26 | Stop reason: HOSPADM

## 2023-04-25 RX ORDER — DOCUSATE SODIUM 100 MG/1
100 CAPSULE, LIQUID FILLED ORAL 2 TIMES DAILY PRN
Status: DISCONTINUED | OUTPATIENT
Start: 2023-04-25 | End: 2023-04-26 | Stop reason: HOSPADM

## 2023-04-25 RX ORDER — MISOPROSTOL 200 UG/1
600 TABLET ORAL
Status: DISCONTINUED | OUTPATIENT
Start: 2023-04-25 | End: 2023-04-26 | Stop reason: HOSPADM

## 2023-04-25 RX ORDER — VITAMIN A ACETATE, BETA CAROTENE, ASCORBIC ACID, CHOLECALCIFEROL, .ALPHA.-TOCOPHEROL ACETATE, DL-, THIAMINE MONONITRATE, RIBOFLAVIN, NIACINAMIDE, PYRIDOXINE HYDROCHLORIDE, FOLIC ACID, CYANOCOBALAMIN, CALCIUM CARBONATE, FERROUS FUMARATE, ZINC OXIDE, CUPRIC OXIDE 3080; 12; 120; 400; 1; 1.84; 3; 20; 22; 920; 25; 200; 27; 10; 2 [IU]/1; UG/1; MG/1; [IU]/1; MG/1; MG/1; MG/1; MG/1; MG/1; [IU]/1; MG/1; MG/1; MG/1; MG/1; MG/1
1 TABLET, FILM COATED ORAL EVERY MORNING
Status: DISCONTINUED | OUTPATIENT
Start: 2023-04-25 | End: 2023-04-26 | Stop reason: HOSPADM

## 2023-04-25 RX ADMIN — LIDOCAINE HYDROCHLORIDE 20 ML: 10 INJECTION, SOLUTION INFILTRATION; PERINEURAL at 01:00

## 2023-04-25 RX ADMIN — PRENATAL WITH FERROUS FUM AND FOLIC ACID 1 TABLET: 3080; 920; 120; 400; 22; 1.84; 3; 20; 10; 1; 12; 200; 27; 25; 2 TABLET ORAL at 09:41

## 2023-04-25 RX ADMIN — OXYTOCIN 20 UNITS: 10 INJECTION, SOLUTION INTRAMUSCULAR; INTRAVENOUS at 01:51

## 2023-04-25 RX ADMIN — SODIUM CHLORIDE, POTASSIUM CHLORIDE, SODIUM LACTATE AND CALCIUM CHLORIDE: 600; 310; 30; 20 INJECTION, SOLUTION INTRAVENOUS at 03:06

## 2023-04-25 RX ADMIN — LIDOCAINE HYDROCHLORIDE 20 ML: 10 INJECTION, SOLUTION INFILTRATION; PERINEURAL at 00:46

## 2023-04-25 RX ADMIN — DOCUSATE SODIUM 100 MG: 100 CAPSULE, LIQUID FILLED ORAL at 12:38

## 2023-04-25 ASSESSMENT — EDINBURGH POSTNATAL DEPRESSION SCALE (EPDS)
I HAVE BEEN SO UNHAPPY THAT I HAVE HAD DIFFICULTY SLEEPING: NOT AT ALL
THE THOUGHT OF HARMING MYSELF HAS OCCURRED TO ME: NEVER
I HAVE FELT SCARED OR PANICKY FOR NO GOOD REASON: NO, NOT MUCH
I HAVE BEEN ANXIOUS OR WORRIED FOR NO GOOD REASON: HARDLY EVER
I HAVE BEEN SO UNHAPPY THAT I HAVE BEEN CRYING: NO, NEVER
THINGS HAVE BEEN GETTING ON TOP OF ME: NO, MOST OF THE TIME I HAVE COPED QUITE WELL
I HAVE LOOKED FORWARD WITH ENJOYMENT TO THINGS: AS MUCH AS I EVER DID
I HAVE BLAMED MYSELF UNNECESSARILY WHEN THINGS WENT WRONG: NOT VERY OFTEN
I HAVE BEEN ABLE TO LAUGH AND SEE THE FUNNY SIDE OF THINGS: AS MUCH AS I ALWAYS COULD
I HAVE FELT SAD OR MISERABLE: NO, NOT AT ALL

## 2023-04-25 ASSESSMENT — PAIN DESCRIPTION - PAIN TYPE
TYPE: ACUTE PAIN

## 2023-04-25 NOTE — CARE PLAN
The patient is Stable - Low risk of patient condition declining or worsening    Shift Goals  Clinical Goals: maintain vitals  Patient Goals: healthy mom and healthy baby  Family Goals: support    Progress made toward(s) clinical / shift goals:     Problem: Altered Physiologic Condition  Goal: Patient physiologically stable as evidenced by normal lochia, palpable uterine involution and vitals within normal limits  Outcome: Progressing  Note: Fundus firm, lochia light, ambulating.      Problem: Infection - Postpartum  Goal: Postpartum patient will be free of signs and symptoms of infection  Outcome: Progressing  Note: Patient remains free from signs and symptoms of infection, vital signs stable.

## 2023-04-25 NOTE — H&P
DATE OF ADMISSION:  2023     OB-GYN ADMISSION HISTORY AND PHYSICAL     IDENTIFICATION:  This is a 23-year-old  1, para 0 with an EDC of   2023 and EGA of 39 and 2/7 weeks, who presents with a chief complaint of   uterine contractions.     HISTORY OF PRESENT ILLNESS:  The patient of Dr. Giordano who has gotten   good prenatal care.  Prenatal care has been uncomplicated.  She has seen her   earlier today, found to be 3 cm, which was unchanged from her previous exams   at home, comes back to labor and delivery complaining of increasing   contractions.  Denies spontaneous rupture of membranes, vaginal bleeding.    Admits good fetal movement.  Denies symptoms of PIH.  Here in labor and   delivery, fetal heart tracings reactive, category 1.  Her cervix is 8, 90, 0   station with a bulging bag of water.  She has been administered a dose of   penicillin secondary to beta strep status.  She has no other complaints.    Denies nausea, vomiting, fever, chills, change in bowel or bladder habits.    She is uncomfortable with her contractions and feeling pressure.     OBSTETRICAL HISTORY:  This is her first pregnancy.     GYNECOLOGIC HISTORY:  Denies STDs, abnormal Paps.     MEDICAL HISTORY:  ALLERGIES:  None.     CURRENT MEDICATIONS:  Prenatal vitamins.     MEDICAL PROBLEMS:  None.     PAST SURGICAL HISTORY:  None.     SOCIAL HISTORY:  Denies alcohol, tobacco or drug abuse.     FAMILY HISTORY:  Noncontributory.     REVIEW OF SYSTEMS:  Times 12 is negative per AMA standards available in chart.     LABORATORY DATA:  Quad screen was normal.  She is O positive, rubella immune.    She had normal one-hour by report, her beta strep is positive.     PHYSICAL EXAMINATION:    VITAL SIGNS:  The patient is afebrile.  Vital signs within normal limits.    Fetal heart tracings reactive, category 1.  She is july regularly.  GENERAL:  She is awake, alert, in no apparent distress.  NECK:  Supple.  HEART:   Regular.  CHEST:  Clear.  BREASTS:  Symmetrical.  ABDOMEN:  Soft, gravid, size appropriate for dates.  EXTREMITIES:  Negative.  GYNECOLOGIC:  As above.     ASSESSMENT:  At this time:  1.  Pregnancy at 39 and 2/7th weeks.  2.  Labor.  3.  Beta strep positive.  4.  Fetal status reassuring.     PLAN:  At this time;  1.  Admit.  2.  Fetal heart tone and contraction monitoring.  3.  Pain control.  4.  Augmentation if necessary.  5.  Consent for and anticipate normal spontaneous vaginal delivery.           ______________________________  MD KARLO Cleary/TYLER    DD:  04/24/2023 19:35  DT:  04/24/2023 20:37    Job#:  [id number]

## 2023-04-25 NOTE — PROGRESS NOTES
Admitted patient from [facility] on Labor and Delivery, oriented patient to room, including call light, emergency call light, television, bed controls,and lights. Plan of care discussed and patient verbalized understanding. Assessment completed, fundus firm and lochia light. Abdominal incision with mepilex silver dressing intact. Patient will call if PRN pain medication intervention needed.

## 2023-04-25 NOTE — PROGRESS NOTES
23 y.o.  @ 39.2 GBS + here to L&D 216 c/o increased contractions. Pt was seen and discharged from triage today at 0700.   EFM & [place] applied. VSS.   1700 SVE -/-1/vertex/mid  Report to Dr. Bourne, admit order received. Report To Abdoulaye FERANNDES to assume pt care. IV placed in LFAx1 attempt. Labs drawn and sent. PCN hung. Fentanyl given per pt request.   MIKAL Duffy at bedside. Baby boy Herbert.

## 2023-04-25 NOTE — PROGRESS NOTES
1700: Report received from Mone FERNANDES. POC discussed. Dr. Bourne updated on pt status by DOM Shore; orders pending.  1815: Dr. Bourne at bedside, updated on pt status.   1825: Pt reporting increased pressure, SVE performed. Unable to assess cervical change due to BBOW. Pt requests AROM. Dr. Bourne phoned for update. MD in OR at this time. MD to come to bedside when able. Pt updated and in agreement with POC.   1900: Report given to Sherice VEGA RN at bedside. POC discussed. Care relinquished.

## 2023-04-25 NOTE — CONSULTS
Initial Visit:    CHARLIE, , delivered her baby today at 0037 at 39.3 weeks gestation.  There are no known risk factors for breastfeeding.      History of BF: None.  This is CHARLIE's first child.    Report of Current Breastfeeding Status: CHARLIE reported she was experiencing pain and bleeding at her right nipple with latch.  However, she stated she received latch assistance from RN earlier today and feels latch is now more comfortable.  She stated she is confident with achieving a deep latch independently.    Breastfeeding Assistance: Latch assistance was offered, but CHARLIE declined.    Provided breastfeeding education on: hunger cues, frequency of breastfeeds, skin to skin, milk production and sore nipple/breast care treatment plan.    CHARLIE reported she is able to perform hand expression independently.    Breast assessment performed.  Please see lactation assessment flow sheet under infant's chart for breast assessment findings.    Plan: Continue to offer infant the breast per feeding cues and within 3-4 hours from the last feed for a minimum of 8 or more feeds in a 24 hour period.  If infant is unable to latch onto the breast between now and when baby turns 24 hours old, CHARLIE should be encouraged to hand express colostrum onto a spoon and feed back her expressed breast milk to infant immediately afterwards.     CHARLIE was informed of the outpatient lactation assistance available to her through [facility].    CHARLIE verbalized understanding of all information provided to her and denied having any lactation questions and/or concerns at this time.  She was encouraged to call for lactation support as needed.

## 2023-04-25 NOTE — CARE PLAN
The patient is Stable - Low risk of patient condition declining or worsening    Shift Goals  Clinical Goals:   Patient Goals: healthy mom and healthy baby  Family Goals: support    Progress made toward(s) clinical / shift goals:  successful  of viable infant male    Patient is not progressing towards the following goals: n/a      Problem: Knowledge Deficit - L&D  Goal: Patient and family/caregivers will demonstrate understanding of plan of care, disease process/condition, diagnostic tests and medications  Outcome: Progressing  Note: Patient encouraged to participate in POC; POC updated as needed throughout pushing process, pt agreeable and verbalized understanding     Problem: Psychosocial - L&D  Goal: Patient's level of anxiety will decrease  Outcome: Progressing  Note: Patient encouraged to voice all thoughts and feelings as her labor process progresses     Problem: Risk for Injury  Goal: Patient and fetus will be free of preventable injury/complications  Outcome: Progressing  Note: Continuous monitoring of uterine activity and fetal well being

## 2023-04-25 NOTE — L&D DELIVERY NOTE
DATE OF SERVICE:  2023     IDENTIFICATION:  This is a 23-year-old  1, para 0 who presents at 39   and 2/7th weeks' complaining of uterine contractions.     HISTORY OF PRESENT ILLNESS:  The patient of Dr. Giordano who has gotten   good prenatal care.  Prenatal care has been uncomplicated.  She is known beta   strep positive.  She presented this evening complaining of contractions.  She   had prior been 3 cm.  Upon presentation, she was 8 cm.  She was subsequently   admitted.  She received IV antibiotics for beta strep positive status and was   AROM'd with moderate meconium.  She was started on Pitocin, progressed over   normal labor curve to complete with an overall reassuring fetal heart tracing.    At complete, she was able to push the baby down to +3 station, extend the   baby's head and deliver atraumatically.  Nares and mouth were bulb suctioned.    A loose nuchal cord was reduced.  Shoulders and body followed without   complication.  After delay, the cord was clamped and cut.  Cord gases were   collected and sent, they are pending.  The infant was handed off to the   waiting respiratory team.  At this point, the placenta delivered intact with   3-vessel cord.  The uterus firmed up nicely after 20 units of Pitocin.  Cervix   was intact.  There was a second-degree midline laceration, which was repaired   under local with 3-0 Monocryl running locked above the hymenal ring, running   below the hymenal ring with subcuticular stitch back up to the hymenal ring.    Estimated blood loss was 200 mL.  The patient and baby in recovery room in   stable condition.     FINDINGS:  Male infant with Apgars of 8 and 9.     COMPLICATIONS:  There were no complications.        ______________________________  MD KARLO Cleary/CINTHYA/LENCHO    DD:  2023 00:58  DT:  2023 03:09    Job#:  [id number]

## 2023-04-25 NOTE — PROGRESS NOTES
190- Beside report received from Iris FERNANDES., POC discussed, care assumed with pt in stable condition, family at bedside. Pt breathing through contractions and coping well at this time.   - Dr. Bourne at bedside, strips reviewed, pt agreeable to AROM. No new orders, room set for delivery.     - Dr. Bourne at bedside to evaluate pushing efforts.    27- Dr. Bourne called to bedside for delivery.   003-  of viable infant male, APGARS assigned, infant bonding skin to skin with mother at this time.     0030- Pt ambulated to bathroom with RN standby assist, steady gait; pericare provided and education given.   0050-  Transported to PP unit via WC in stable condition with infant in arms, personal belongings and family at side.  Bedside report given to Jaylin FERNANDES., POC discussed, care relinquished. ID bands verified with two nurses. Pt and family deny any further questions or needs from this RN.

## 2023-04-26 VITALS
RESPIRATION RATE: 18 BRPM | OXYGEN SATURATION: 96 % | BODY MASS INDEX: 28.47 KG/M2 | TEMPERATURE: 98.7 F | HEART RATE: 93 BPM | DIASTOLIC BLOOD PRESSURE: 66 MMHG | HEIGHT: 60 IN | SYSTOLIC BLOOD PRESSURE: 100 MMHG | WEIGHT: 145 LBS

## 2023-04-26 PROCEDURE — A9270 NON-COVERED ITEM OR SERVICE: HCPCS | Performed by: OBSTETRICS & GYNECOLOGY

## 2023-04-26 PROCEDURE — 700102 HCHG RX REV CODE 250 W/ 637 OVERRIDE(OP): Performed by: OBSTETRICS & GYNECOLOGY

## 2023-04-26 RX ADMIN — PRENATAL WITH FERROUS FUM AND FOLIC ACID 1 TABLET: 3080; 920; 120; 400; 22; 1.84; 3; 20; 10; 1; 12; 200; 27; 25; 2 TABLET ORAL at 07:47

## 2023-04-26 NOTE — PROGRESS NOTES
"0706- Bedside report received from DOM Mosqueda.  Patient denied needs.  0939- Patient back in bed after using bathroom.  Patient reported voiding without difficulty and \"light bleeding\".  Patient ambulated to [facility] to accompany infant.  1240- Patient is back in the room.  Patient assessment done.  Patient reported she is voiding without difficulty and passing flatus.  Patient denied dizziness and reported she is walking without difficulty.  Patient denied pain.  Reviewed plan of care.  Patient verbalized understanding.  FOB at bedside.  1355- Patient able to latch infant with minimal assist.  "

## 2023-04-26 NOTE — LACTATION NOTE
"Lactation follow-up visit:    MOB reported severe \"soreness\" at breasts with latch.  MOB stated infant breast fed often last night and she needed to \"take breaks\" to allow time for soreness at breasts to decrease.  MOB reported she continues to apply lanolin cream to breasts as instructed.  She also wanted input as to initiating pumping and bottle feeding at this time to allow breasts time to rest.    Lactation assistance was offered, but MOB declined at this time.  She stated infant was just bathed and was doing skin to skin with FOB to help bring his body temperature back up.  MOB stated after skin to skin, the hearing aid technician will return to the room to have baby's hearing screen performed.  Will provide guidance on pumping once latch as been assessed.    MOB stated she will call for LC to return to room when she is ready to put baby to the breast again.    Infant's weight loss to date (2.84%) remains within defined limits along with voiding and stooling pattern.    Provided MOB with written breastfeeding resource page and invited her to attend the [facility] [place].    Breastfeeding plan:  Continue to offer infant the breast per feeding cues for a minimum of 8 or more feeds in a 24 hour period.    MOB verbalized understanding of all information provided to her and denied having any further questions at this time.     "

## 2023-04-26 NOTE — DISCHARGE INSTRUCTIONS
PATIENT DISCHARGE EDUCATION INSTRUCTION SHEET    REASONS TO CALL YOUR OBSTETRICIAN  Persistent fever, shaking, chills (Temperature higher than 100.4) may indicate you have an infection  Heavy bleeding: soaking more than 1 pad per hour; Passing clots an egg-sized clot or bigger may mean you have an postpartum hemorrhage  Foul odor from vagina or bad smelling or discolored discharge or blood  Breast infection (Mastitis symptoms); breast pain, chills, fever, redness or red streaks, may feel flu like symptoms  Urinary pain, burning or frequency  Incision that is not healing, increased redness, swelling, tenderness or pain, or any pus from episiotomy or  site may mean you have an infection  Redness, swelling, warmth, or painful to touch in the calf area of your leg may mean you have a blood clot  Severe or intensified depression, thoughts or feelings of wanting to hurt yourself or someone else   Pain in chest, obstructed breathing or shortness of breath (trouble catching your breath) may mean you are having a postpartum complication. Call your provider immediately   Headache that does not get better, even after taking medicine, a bad headache with vision changes or pain in the upper right area of your belly may mean you have high blood pressure or post birth preeclampsia. Call your provider immediately    HAND WASHING  All family and friends should wash their hands:  Before and after holding the baby  Before feeding the baby  After using the restroom or changing the baby's diaper    WOUND CARE  Ask your physician for additional care instructions. In general:  Episiotomy/Laceration  May use ramiro-spray bottle, witch hazel pads and dermaplast spray for comfort  Use ramiro-spray bottle after urinating to cleanse perineal area  To prevent burning during urination spray ramiro-water bottle on labial area   Pat perineal area dry until episiotomy/laceration is healed  Continue to use ramiro-bottle until bleeding stops as  needed  If have a 2nd degree laceration or greater, a Sitz bath can offer relief from soreness, burning, and inflammation   Sitz Bath   Sit in 6 inches of warm water and soak laceration as needed until the laceration heals    VAGINAL CARE AND BLEEDING  Nothing inside vagina for 6 weeks:   No sexual intercourse, tampons or douching  Bleeding may continue for 2-4 weeks. Amount and color may vary  Soaking 1 pad or more in an hour for several hours is considered heavy bleeding  Passing large egg sized blood clots can be concerning  If you feel like you have heavy bleeding or are having increasing amount of blood clots call your Obstetrician immediately  If you begin feeling faint upon standing, feeling sick to your stomach, have clammy skin, a really fast heartbeat, have chills, start feeling confused, dizzy, sleepy or weak, or feeling like you're going to faint call your Obstetrician immediately    HYPERTENSION   Preeclampsia or gestational hypertension are types of high blood pressure that only pregnant women can get. It is important for you to be aware of symptoms to seek early intervention and treatment. If you have any of these symptoms immediately call your Obstetrician    Vision changes or blurred vision   Severe headache or pain that is unrelieved with medication and will not go away  Persistent pain in upper abdomen or shoulder   Increased swelling of face, feet, or hands  Difficulty breathing or shortness of breath at rest  Urinating less than usual    URINATION AND BOWEL MOVEMENTS  Eating more fiber (bran cereal, fruits, and vegetables) and drinking plenty of fluids will help to avoid constipation  Urinary frequency and urgency after childbirth is normal  If you experience any urinary pain, burning or frequency call your provider    BIRTH CONTROL  It is possible to become pregnant at any time after delivery and while breastfeeding  Plan to discuss a method of birth control with your physician at your post  "delivery follow up visit    POSTPARTUM BLUES  During the first few days after birth, you may experience a sense of the \"blues\" which may include impatience, irritability or even crying. These feelings come and go quickly. However, as many as 1 in 10 women experience emotional symptoms known as postpartum depression.     POSTPARTUM DEPRESSION    May start as early as the second or third day after delivery or take several weeks or months to develop. Symptoms of \"blues\" are present, but are more intense: Crying spells; loss of appetite; feelings of hopelessness or loss of control; fear of touching the baby; over concern or no concern at all about the baby; little or no concern about your own appearance/caring for yourself; and/or inability to sleep or excessive sleeping. Contact your Obstetrician if you are experiencing any of these symptoms     PREVENTING SHAKEN BABY  If you are angry or stressed, PUT THE BABY IN THE CRIB, step away, take some deep breaths, and wait until you are calm to care for the baby. DO NOT SHAKE THE BABY. You are not alone, call a supporter for help.  [facility] 24/7 crisis call line ([phone]) or (1-287.974.1603)  You can also text them, text \"ANSWER\" (926711)  "

## 2023-04-26 NOTE — CARE PLAN
The patient is Stable - Low risk of patient condition declining or worsening    Shift Goals  Clinical Goals: Maintain fundus firm, lochia light; pain management  Patient Goals: healthy mom and healthy baby  Family Goals: support    Progress made toward(s) clinical / shift goals:  Fundus firm, lochia scant; pt denied need for pain intervention throughout the shift.

## 2023-04-26 NOTE — DISCHARGE SUMMARY
Discharge Summary:      Orquidea Lopez    Admit Date:   2023  Discharge Date:  2023     Admitting diagnosis:  Labor and delivery, indication for care [O75.9]  Discharge Diagnosis: Status post vaginal, spontaneous.  Pregnancy Complications: none  Tubal Ligation:  no        History:  History reviewed. No pertinent past medical history.  OB History    Para Term  AB Living   1 1 1     1   SAB IAB Ectopic Molar Multiple Live Births           0 1      # Outcome Date GA Lbr Júnior/2nd Weight Sex Delivery Anes PTL Lv   1 Term 23 39w3d / 03:32 3.52 kg (7 lb 12.2 oz) M Vag-Spont None N YESENIA        Patient has no known allergies.  Patient Active Problem List    Diagnosis Date Noted    Labor and delivery, indication for care 2023        Hospital Course:   23 y.o. , now para 1, was admitted with the above mentioned diagnosis, underwent Active Labor, vaginal, spontaneous. Patient postpartum course was unremarkable, with progressive advancement in diet , ambulation and toleration of oral analgesia. Patient without complaints today and desires discharge.      Vitals:    23 1405 23 1821 23 2200 23 0600   BP: 107/65 109/69 110/71 100/66   Pulse: 98 (!) 105 94 93   Resp: 18 18 18 18   Temp: 37.1 °C (98.8 °F) 37 °C (98.6 °F) 37.2 °C (98.9 °F) 37.1 °C (98.7 °F)   TempSrc: Temporal Temporal Temporal Temporal   SpO2: 97% 98% 97% 96%   Weight:       Height:           Current Facility-Administered Medications   Medication Dose    lactated ringers infusion      miSOPROStol (CYTOTEC) tablet 600 mcg  600 mcg    docusate sodium (COLACE) capsule 100 mg  100 mg    ibuprofen (MOTRIN) tablet 800 mg  800 mg    acetaminophen (TYLENOL) tablet 1,000 mg  1,000 mg    prenatal plus vitamin (STUARTNATAL 1+1) 27-1 MG tablet 1 Tablet  1 Tablet    bisacodyl (DULCOLAX) suppository 10 mg  10 mg    magnesium hydroxide (MILK OF MAGNESIA) suspension 30 mL  30 mL    oxyCODONE immediate-release  (ROXICODONE) tablet 5 mg  5 mg    LR infusion         Exam:  Breast Exam: negative  Abdomen: Abdomen soft, non-tender. BS normal. No masses,  No organomegaly  Fundus Non Tender: yes  Incision: none  Perineum: perineum intact  Extremity: extremities, peripheral pulses and reflexes normal     Labs:  Recent Labs     04/24/23  1715 04/25/23  1304   WBC 15.4* 19.5*   RBC 4.54 3.98*   HEMOGLOBIN 14.1 12.5   HEMATOCRIT 41.3 36.8*   MCV 91.0 92.5   MCH 31.1 31.4   MCHC 34.1 34.0   RDW 55.1* 57.3*   PLATELETCT 253 202   MPV 10.5 10.8        Activity:   Discharge to home  Pelvic Rest x 6 weeks    Assessment:  normal postpartum course  Discharge Assessment: Taking adequate diet and fluids     Follow up: 6w with Dr. Lala.      Discharge Meds:   No current outpatient medications on file.       Orquidea Wells M.D.

## 2023-04-26 NOTE — PROGRESS NOTES
Bedside report received from Koffi MARINELLI RN. Patient care assumed. Chart, prenatal labs, and orders reviewed  Patient assessment complete and WDL. Fundus firm and lochia scant. Patient ambulating to bathroom and voiding without difficulty. Patient denies any dizziness/lightheadedness or calf pain/tenderness. Patient also denies any chest pain, difficulty breathing or SOB. Plan of care discussed with patient for the day including infant feeding every 2-3 hours or on demand, pain management, and ambulation in halls. Pain medication plan discussed with patient; patient states she will call if PRN pain medication is wanted. All questions/concerns addressed at this time. Call light within reach, encouraged to call with needs. Will continue with routine postpartum cares and proceed with discharge home.

## 2023-04-26 NOTE — CARE PLAN
Problem: Pain - Standard  Goal: Alleviation of pain or a reduction in pain to the patient’s comfort goal  Outcome: Met     Problem: Knowledge Deficit - Postpartum  Goal: Patient will verbalize and demonstrate understanding of self and infant care  Outcome: Met     Problem: Psychosocial - Postpartum  Goal: Patient will verbalize and demonstrate effective bonding and parenting behavior  Outcome: Met     Problem: Altered Physiologic Condition  Goal: Patient physiologically stable as evidenced by normal lochia, palpable uterine involution and vitals within normal limits  Outcome: Met     Problem: Infection - Postpartum  Goal: Postpartum patient will be free of signs and symptoms of infection  Outcome: Met   The patient is Stable - Low risk of patient condition declining or worsening    Shift Goals  Clinical Goals: pain management, ambulate  Patient Goals: healthy mom and healthy baby  Family Goals: support    Progress made toward(s) clinical / shift goals:  patient has not had any pain requiring PRN pain medication this shift and is ambulating in room    Patient is not progressing towards the following goals: N/A

## 2023-04-26 NOTE — CARE PLAN
The patient is Stable - Low risk of patient condition declining or worsening    Shift Goals  Clinical Goals: rest; lochia WDL  Patient Goals: healthy mom and healthy baby  Family Goals: support    Progress made toward(s) clinical / shift goals:  Pt slept throughout the night, with minimal interruptions for care. Lochia scant throughout the shift.     Patient is not progressing towards the following goals:

## 2023-04-26 NOTE — PROGRESS NOTES
1900: Received bedside report from day shift RNTrey Whiteboard updated.     2105: Completed assessment. Pt denies of pain at this time. IV saline locked. No signs of phlebitis or infiltration. IV removed, per order. POC discussed. Verbalized understanding. Call light placed within reach.

## 2023-04-26 NOTE — PROGRESS NOTES
ID bands verified by this RN. Discharge instructions reviewed with patient and signed by patient. Follow up appointments and discharge medications reviewed with patient; can take OTC pain relievers as needed. All questions addressed at this time. Instructed to press call light when infant strapped in car seat for car seat check.    AVS scanned into [employer].

## 2023-04-27 NOTE — PROGRESS NOTES
Patient discharged off unit via wheelchair with all personal belongings accompanied by CNA and SO with infant in car seat.

## 2023-07-12 ENCOUNTER — EH NON-PROVIDER (OUTPATIENT)
Dept: OCCUPATIONAL MEDICINE | Facility: CLINIC | Age: 23
End: 2023-07-12

## 2023-07-12 DIAGNOSIS — Z02.89 ENCOUNTER FOR OCCUPATIONAL HEALTH EXAMINATION INVOLVING RESPIRATOR: ICD-10-CM

## 2023-07-12 PROCEDURE — 94375 RESPIRATORY FLOW VOLUME LOOP: CPT | Performed by: NURSE PRACTITIONER

## 2023-10-09 ENCOUNTER — IMMUNIZATION (OUTPATIENT)
Dept: OCCUPATIONAL MEDICINE | Facility: CLINIC | Age: 23
End: 2023-10-09

## 2023-10-09 DIAGNOSIS — Z23 NEED FOR VACCINATION: Primary | ICD-10-CM

## 2023-10-09 PROCEDURE — 90686 IIV4 VACC NO PRSV 0.5 ML IM: CPT | Performed by: PREVENTIVE MEDICINE

## 2023-12-08 ENCOUNTER — OFFICE VISIT (OUTPATIENT)
Dept: MEDICAL GROUP | Age: 23
End: 2023-12-08
Payer: COMMERCIAL

## 2023-12-08 VITALS
TEMPERATURE: 97 F | SYSTOLIC BLOOD PRESSURE: 104 MMHG | HEART RATE: 92 BPM | BODY MASS INDEX: 24.54 KG/M2 | HEIGHT: 60 IN | DIASTOLIC BLOOD PRESSURE: 60 MMHG | OXYGEN SATURATION: 98 % | WEIGHT: 125 LBS | RESPIRATION RATE: 16 BRPM

## 2023-12-08 DIAGNOSIS — Z00.00 WELL ADULT EXAM: ICD-10-CM

## 2023-12-08 DIAGNOSIS — Z23 NEED FOR VACCINATION: ICD-10-CM

## 2023-12-08 PROCEDURE — 3078F DIAST BP <80 MM HG: CPT | Performed by: PHYSICIAN ASSISTANT

## 2023-12-08 PROCEDURE — 3074F SYST BP LT 130 MM HG: CPT | Performed by: PHYSICIAN ASSISTANT

## 2023-12-08 PROCEDURE — 90471 IMMUNIZATION ADMIN: CPT | Performed by: PHYSICIAN ASSISTANT

## 2023-12-08 PROCEDURE — 99214 OFFICE O/P EST MOD 30 MIN: CPT | Mod: 25 | Performed by: PHYSICIAN ASSISTANT

## 2023-12-08 PROCEDURE — 99395 PREV VISIT EST AGE 18-39: CPT | Mod: 25 | Performed by: PHYSICIAN ASSISTANT

## 2023-12-08 PROCEDURE — 90621 MENB-FHBP VACC 2/3 DOSE IM: CPT | Performed by: PHYSICIAN ASSISTANT

## 2023-12-08 RX ORDER — SERTRALINE HYDROCHLORIDE 25 MG/1
25 TABLET, FILM COATED ORAL DAILY
Qty: 30 TABLET | Refills: 2 | Status: SHIPPED | OUTPATIENT
Start: 2023-12-08 | End: 2024-01-12

## 2023-12-08 NOTE — PROGRESS NOTES
Subjective:     CC:   Chief Complaint   Patient presents with    Annual Exam       HPI:   Orquidea Lopez is a 23 y.o. female who presents for annual exam.    She also has concerns of postpartum blues or postpartum depression.  She states that for the past few months she finds herself very tearful, emotional, just down.  She has had suicidal ideation, states she would never act on these, has not thought about a plan.  Her baby is 7 months, she has not thought of any harm to him.  She is no longer breast-feeding.  She would be interested in counseling, also possibly medications.    Patient has GYN provider: Yes  Last Pap Smear: 2023   H/O Abnormal Pap: No  Last Tdap: 2023  Received HPV series: Yes    Exercise: Walking daily  Diet: Well-rounded    Patient's last menstrual period was 2023 (exact date).  Hx STDs: No  Birth control: None  Menses every month with 7 days with light, moderate bleeding.  Reports mild cramping and does not take OTC analgesics for cramps.  No significant bloating/fluid retention, pelvic pain, or dyspareunia. No abnormal vaginal discharge.  No breast tenderness, mass, nipple discharge, changes in size or contour, or abnormal cyclic discomfort.    OB History    Para Term  AB Living   1 1 1 0 0 1   SAB IAB Ectopic Molar Multiple Live Births   0 0 0 0 0 1      She  reports being sexually active and has had partner(s) who are male. She reports using the following method of birth control/protection: Pill.    She  has no past medical history on file.  She  has no past surgical history on file.    Family History   Problem Relation Age of Onset    Hypertension Father      Social History     Tobacco Use    Smoking status: Never    Smokeless tobacco: Never   Vaping Use    Vaping Use: Never used   Substance Use Topics    Alcohol use: Never    Drug use: Never       Patient Active Problem List    Diagnosis Date Noted    Post partum depression 2023     Current Outpatient  Medications   Medication Sig Dispense Refill    sertraline (ZOLOFT) 25 MG tablet Take 1 Tablet by mouth every day. 30 Tablet 2    Prenatal Vit-Fe Fumarate-FA (PRENATAL 19 PO) Take  by mouth.       No current facility-administered medications for this visit.     No Known Allergies    Review of Systems   Constitutional: Negative for fever, chills and malaise/fatigue.   HENT: Negative for congestion.    Eyes: Negative for pain.   Respiratory: Negative for cough and shortness of breath.    Cardiovascular: Negative for chest pain and leg swelling.   Gastrointestinal: Negative for nausea, vomiting, abdominal pain and diarrhea.   Genitourinary: Negative for dysuria and hematuria.   Skin: Negative for rash.   Neurological: Negative for dizziness, focal weakness and headaches.   Endo/Heme/Allergies: Does not bruise/bleed easily.   Psychiatric/Behavioral: Positive for depression.  The patient is not nervous/anxious.      Objective:   /60 (BP Location: Right arm, Patient Position: Sitting, BP Cuff Size: Adult)   Pulse 92   Temp 36.1 °C (97 °F) (Temporal)   Resp 16   Ht 1.524 m (5')   Wt 56.7 kg (125 lb)   LMP 11/24/2023 (Exact Date)   SpO2 98%   Breastfeeding No   BMI 24.41 kg/m²     Wt Readings from Last 4 Encounters:   12/08/23 56.7 kg (125 lb)   04/24/23 65.8 kg (145 lb)   04/24/23 65.8 kg (145 lb)   12/02/22 53.1 kg (117 lb)         Physical Exam:  Constitutional: Well-developed and well-nourished. Not diaphoretic. No distress.   Skin: Skin is warm and dry. No rash noted.  Head: Atraumatic without lesions.  Eyes: Conjunctivae and extraocular motions are normal. Pupils are equal, round, and reactive to light. No scleral icterus.   Ears:  External ears unremarkable. Tympanic membranes clear and intact.  Mouth/Throat: Tongue normal. Oropharynx is clear and moist. Posterior pharynx without erythema or exudates.  Neck: Supple, trachea midline. Normal range of motion. No thyromegaly present. No  lymphadenopathy--cervical or supraclavicular.  Cardiovascular: Regular rate and rhythm, S1 and S2 without murmur, rubs, or gallops.    Respiratory: Effort normal. Clear to auscultation throughout. No adventitious sounds.   Abdomen: Soft, non tender, and without distention. Active bowel sounds in all four quadrants. No rebound, guarding, masses or HSM.  Extremities: No cyanosis, clubbing, erythema, nor edema. Distal pulses intact and symmetric.   Musculoskeletal: All major joints AROM full in all directions without pain.  Neurological: Alert and oriented x 3. Grossly non-focal. Strength and sensation grossly intact. DTRs 2+/3 and symmetric.   Psychiatric:  Behavior, mood, and affect are appropriate.  Tearful at times    Assessment and Plan:     1. Well adult exam  Continue with regular physical activity as tolerated.  Reviewed diet control.    2. Post partum depression  -New concern.  Had lengthy discussion with patient, she did does have postpartum depression.  Discussed various treatment options.  She is interested in counseling, referral placed.  Amicable to starting medications.  Will start on 25 mg of Zoloft.  Advised if after 2 weeks she is tolerating medication well can increase to 2 tabs-50 mg.  She is not a harm to herself or others, or her child.  She does have good support system at home.  - sertraline (ZOLOFT) 25 MG tablet; Take 1 Tablet by mouth every day.  Dispense: 30 Tablet; Refill: 2  - Referral to Behavioral Health    3. Need for vaccination  -Immunization given in clinic today  - Meningococcal Vaccine Serogroup B 2-3 Dose IM      Health maintenance:     Labs per orders  Immunizations per orders  Patient counseled about skin care, diet, supplements, and exercise.  Discussed  breast self exam, family planning choices, diet and exercise     Follow-up: Return in about 4 weeks (around 1/5/2024) for Depression.

## 2024-01-12 ENCOUNTER — TELEMEDICINE (OUTPATIENT)
Dept: MEDICAL GROUP | Age: 24
End: 2024-01-12
Payer: COMMERCIAL

## 2024-01-12 VITALS — HEIGHT: 60 IN | RESPIRATION RATE: 16 BRPM | WEIGHT: 125 LBS | BODY MASS INDEX: 24.54 KG/M2

## 2024-01-12 PROCEDURE — 99214 OFFICE O/P EST MOD 30 MIN: CPT | Mod: 95 | Performed by: PHYSICIAN ASSISTANT

## 2024-01-12 NOTE — PROGRESS NOTES
Virtual Visit: Established Patient   This visit was conducted via Zoom using secure and encrypted videoconferencing technology.   The patient was in their home in the [facility].    The patient's identity was confirmed and verbal consent was obtained for this virtual visit.     Subjective:   CC:   Chief Complaint   Patient presents with    Follow-Up       Orquidea Lopez is a 23 y.o. female presenting for follow-up postpartum depression.  Last visit in discussion she had found herself very tearful, emotional, just generally down.  She had had suicidal ideations, had not thought out a plan, no thoughts of harm to others or her baby.  We did discuss medication management, she was amicable to starting 25 mg of Zoloft and counseling referral.  She overall tolerated the Zoloft well.  Noted significant improvement in her depressive symptoms.  She has been feeling much better, so she decided to stop taking Zoloft.  Has been off of this for about 3 days.  Still emotionally doing well.  At this point in time she does not want to continue on medications.  Denies SI or HI.        ROS   See above    Current medicines (including changes today)  No current outpatient medications on file.     No current facility-administered medications for this visit.       Patient Active Problem List    Diagnosis Date Noted    Post partum depression 12/08/2023        Objective:   Resp 16   Ht 1.524 m (5')   Wt 56.7 kg (125 lb)   BMI 24.41 kg/m²     Physical Exam:  Constitutional: Alert, no distress, well-groomed.  Skin: No rashes in visible areas.  Eye: Round. Conjunctiva clear, lids normal. No icterus.   ENMT: Lips pink without lesions, good dentition, moist mucous membranes. Phonation normal.  Neck: No masses, no thyromegaly. Moves freely without pain.  Respiratory: Unlabored respiratory effort, no cough or audible wheeze  Psych: Alert and oriented x3, normal affect and mood.     Assessment and Plan:   The following treatment  plan was discussed:     1. Post partum depression  Overall tolerated Zoloft well.  Fortunately she is feeling much better, does not feel she needs been on the medication.  Did discuss that as she is only been off of this for 3 days, possibly could have been the medication that was improving her mood.  If at any point she starts to have worsening of depressive symptoms, advised to go ahead and restart 25 mg of Zoloft and then follow-up with me.  If depressive symptoms do not return then postpartum depression has resolved.    Follow-up: Return in about 11 months (around 12/12/2024) for Annual PX.

## 2024-03-29 ENCOUNTER — HOSPITAL ENCOUNTER (OUTPATIENT)
Dept: LAB | Facility: MEDICAL CENTER | Age: 24
End: 2024-03-29
Attending: OBSTETRICS & GYNECOLOGY
Payer: COMMERCIAL

## 2024-03-29 LAB
25(OH)D3 SERPL-MCNC: 31 NG/ML (ref 30–100)
ABO GROUP BLD: NORMAL
BASOPHILS # BLD AUTO: 0.2 % (ref 0–1.8)
BASOPHILS # BLD: 0.02 K/UL (ref 0–0.12)
BLD GP AB SCN SERPL QL: NORMAL
EOSINOPHIL # BLD AUTO: 0.04 K/UL (ref 0–0.51)
EOSINOPHIL NFR BLD: 0.5 % (ref 0–6.9)
ERYTHROCYTE [DISTWIDTH] IN BLOOD BY AUTOMATED COUNT: 44.8 FL (ref 35.9–50)
HBV SURFACE AG SER QL: NORMAL
HCT VFR BLD AUTO: 40.5 % (ref 37–47)
HCV AB SER QL: NORMAL
HGB BLD-MCNC: 13.5 G/DL (ref 12–16)
HIV 1+2 AB+HIV1 P24 AG SERPL QL IA: NORMAL
IMM GRANULOCYTES # BLD AUTO: 0.03 K/UL (ref 0–0.11)
IMM GRANULOCYTES NFR BLD AUTO: 0.3 % (ref 0–0.9)
LYMPHOCYTES # BLD AUTO: 2.16 K/UL (ref 1–4.8)
LYMPHOCYTES NFR BLD: 24.9 % (ref 22–41)
MCH RBC QN AUTO: 31.9 PG (ref 27–33)
MCHC RBC AUTO-ENTMCNC: 33.3 G/DL (ref 32.2–35.5)
MCV RBC AUTO: 95.7 FL (ref 81.4–97.8)
MONOCYTES # BLD AUTO: 0.72 K/UL (ref 0–0.85)
MONOCYTES NFR BLD AUTO: 8.3 % (ref 0–13.4)
NEUTROPHILS # BLD AUTO: 5.72 K/UL (ref 1.82–7.42)
NEUTROPHILS NFR BLD: 65.8 % (ref 44–72)
NRBC # BLD AUTO: 0 K/UL
NRBC BLD-RTO: 0 /100 WBC (ref 0–0.2)
PLATELET # BLD AUTO: 350 K/UL (ref 164–446)
PMV BLD AUTO: 10.1 FL (ref 9–12.9)
RBC # BLD AUTO: 4.23 M/UL (ref 4.2–5.4)
RH BLD: NORMAL
RUBV AB SER QL: >500 IU/ML
T PALLIDUM AB SER QL IA: NORMAL
WBC # BLD AUTO: 8.7 K/UL (ref 4.8–10.8)

## 2024-03-29 PROCEDURE — 86850 RBC ANTIBODY SCREEN: CPT

## 2024-03-29 PROCEDURE — 87389 HIV-1 AG W/HIV-1&-2 AB AG IA: CPT

## 2024-03-29 PROCEDURE — 86787 VARICELLA-ZOSTER ANTIBODY: CPT

## 2024-03-29 PROCEDURE — 87340 HEPATITIS B SURFACE AG IA: CPT

## 2024-03-29 PROCEDURE — 86900 BLOOD TYPING SEROLOGIC ABO: CPT

## 2024-03-29 PROCEDURE — 86762 RUBELLA ANTIBODY: CPT

## 2024-03-29 PROCEDURE — 82306 VITAMIN D 25 HYDROXY: CPT

## 2024-03-29 PROCEDURE — 85025 COMPLETE CBC W/AUTO DIFF WBC: CPT

## 2024-03-29 PROCEDURE — 87086 URINE CULTURE/COLONY COUNT: CPT

## 2024-03-29 PROCEDURE — 86780 TREPONEMA PALLIDUM: CPT

## 2024-03-29 PROCEDURE — 86901 BLOOD TYPING SEROLOGIC RH(D): CPT

## 2024-03-29 PROCEDURE — 86803 HEPATITIS C AB TEST: CPT

## 2024-03-31 LAB
BACTERIA UR CULT: NORMAL
SIGNIFICANT IND 70042: NORMAL
SITE SITE: NORMAL
SOURCE SOURCE: NORMAL
VZV IGG SER IA-ACNC: 37.1 IV

## 2024-04-26 ENCOUNTER — HOSPITAL ENCOUNTER (OUTPATIENT)
Facility: MEDICAL CENTER | Age: 24
End: 2024-04-26
Attending: OBSTETRICS & GYNECOLOGY
Payer: COMMERCIAL

## 2024-04-29 LAB
# FETUSES US: NORMAL
AFP MOM SERPL: 0.73
AFP SERPL-MCNC: 29 NG/ML
AGE - REPORTED: 22.5 YR
CURRENT SMOKER: NO
FAMILY MEMBER DISEASES HX: NO
GA METHOD: NORMAL
GA: NORMAL WK
IDDM PATIENT QL: NO
INTEGRATED SCN PATIENT-IMP: NORMAL
SPECIMEN DRAWN SERPL: NORMAL

## 2024-05-23 ENCOUNTER — HOSPITAL ENCOUNTER (OUTPATIENT)
Facility: MEDICAL CENTER | Age: 24
End: 2024-05-23
Attending: OBSTETRICS & GYNECOLOGY
Payer: COMMERCIAL

## 2024-05-25 LAB
# FETUSES US: NORMAL
AFP MOM SERPL: 0.65
AFP SERPL-MCNC: 44 NG/ML
AGE - REPORTED: 22.5 YR
CURRENT SMOKER: NO
FAMILY MEMBER DISEASES HX: NO
GA METHOD: NORMAL
GA: NORMAL WK
HCG MOM SERPL: 0.55
HCG SERPL-ACNC: NORMAL IU/L
HX OF HEREDITARY DISORDERS: NO
IDDM PATIENT QL: NO
INHIBIN A MOM SERPL: 0.86
INHIBIN A SERPL-MCNC: 154 PG/ML
INTEGRATED SCN PATIENT-IMP: NORMAL
PATHOLOGY STUDY: NORMAL
SPECIMEN DRAWN SERPL: NORMAL
U ESTRIOL MOM SERPL: 1.38
U ESTRIOL SERPL-MCNC: 4.66 NG/ML

## 2024-07-19 ENCOUNTER — HOSPITAL ENCOUNTER (OUTPATIENT)
Facility: MEDICAL CENTER | Age: 24
End: 2024-07-19
Payer: COMMERCIAL

## 2024-07-19 LAB
25(OH)D3 SERPL-MCNC: 42 NG/ML (ref 30–100)
BASOPHILS # BLD AUTO: 0.3 % (ref 0–1.8)
BASOPHILS # BLD: 0.03 K/UL (ref 0–0.12)
EOSINOPHIL # BLD AUTO: 0.1 K/UL (ref 0–0.51)
EOSINOPHIL NFR BLD: 1 % (ref 0–6.9)
ERYTHROCYTE [DISTWIDTH] IN BLOOD BY AUTOMATED COUNT: 45.7 FL (ref 35.9–50)
GLUCOSE 1H P 50 G GLC PO SERPL-MCNC: 131 MG/DL (ref 70–139)
HCT VFR BLD AUTO: 33.4 % (ref 37–47)
HGB BLD-MCNC: 10.5 G/DL (ref 12–16)
IMM GRANULOCYTES # BLD AUTO: 0.04 K/UL (ref 0–0.11)
IMM GRANULOCYTES NFR BLD AUTO: 0.4 % (ref 0–0.9)
LYMPHOCYTES # BLD AUTO: 2.17 K/UL (ref 1–4.8)
LYMPHOCYTES NFR BLD: 22.7 % (ref 22–41)
MCH RBC QN AUTO: 30.3 PG (ref 27–33)
MCHC RBC AUTO-ENTMCNC: 31.4 G/DL (ref 32.2–35.5)
MCV RBC AUTO: 96.5 FL (ref 81.4–97.8)
MONOCYTES # BLD AUTO: 0.85 K/UL (ref 0–0.85)
MONOCYTES NFR BLD AUTO: 8.9 % (ref 0–13.4)
NEUTROPHILS # BLD AUTO: 6.37 K/UL (ref 1.82–7.42)
NEUTROPHILS NFR BLD: 66.7 % (ref 44–72)
NRBC # BLD AUTO: 0 K/UL
NRBC BLD-RTO: 0 /100 WBC (ref 0–0.2)
PLATELET # BLD AUTO: 349 K/UL (ref 164–446)
PMV BLD AUTO: 10.1 FL (ref 9–12.9)
RBC # BLD AUTO: 3.46 M/UL (ref 4.2–5.4)
T PALLIDUM AB SER QL IA: NORMAL
WBC # BLD AUTO: 9.6 K/UL (ref 4.8–10.8)

## 2024-09-11 ENCOUNTER — HOSPITAL ENCOUNTER (EMERGENCY)
Facility: MEDICAL CENTER | Age: 24
End: 2024-09-11
Attending: STUDENT IN AN ORGANIZED HEALTH CARE EDUCATION/TRAINING PROGRAM
Payer: COMMERCIAL

## 2024-09-11 VITALS
WEIGHT: 140.21 LBS | DIASTOLIC BLOOD PRESSURE: 57 MMHG | SYSTOLIC BLOOD PRESSURE: 100 MMHG | BODY MASS INDEX: 27.53 KG/M2 | HEART RATE: 100 BPM | TEMPERATURE: 98.2 F | RESPIRATION RATE: 14 BRPM | HEIGHT: 60 IN | OXYGEN SATURATION: 98 %

## 2024-09-11 DIAGNOSIS — Z34.90 PREGNANCY, UNSPECIFIED GESTATIONAL AGE: ICD-10-CM

## 2024-09-11 DIAGNOSIS — D64.9 ANEMIA, UNSPECIFIED TYPE: ICD-10-CM

## 2024-09-11 DIAGNOSIS — L29.9 PRURITUS: ICD-10-CM

## 2024-09-11 LAB
ALBUMIN SERPL BCP-MCNC: 3.3 G/DL (ref 3.2–4.9)
ALBUMIN/GLOB SERPL: 1.1 G/DL
ALP SERPL-CCNC: 170 U/L (ref 30–99)
ALT SERPL-CCNC: 9 U/L (ref 2–50)
ANION GAP SERPL CALC-SCNC: 13 MMOL/L (ref 7–16)
AST SERPL-CCNC: 17 U/L (ref 12–45)
BASOPHILS # BLD AUTO: 0.2 % (ref 0–1.8)
BASOPHILS # BLD: 0.02 K/UL (ref 0–0.12)
BILIRUB SERPL-MCNC: 0.2 MG/DL (ref 0.1–1.5)
BUN SERPL-MCNC: 7 MG/DL (ref 8–22)
CALCIUM ALBUM COR SERPL-MCNC: 9.2 MG/DL (ref 8.5–10.5)
CALCIUM SERPL-MCNC: 8.6 MG/DL (ref 8.4–10.2)
CHLORIDE SERPL-SCNC: 99 MMOL/L (ref 96–112)
CO2 SERPL-SCNC: 21 MMOL/L (ref 20–33)
CREAT SERPL-MCNC: 0.65 MG/DL (ref 0.5–1.4)
EOSINOPHIL # BLD AUTO: 0.14 K/UL (ref 0–0.51)
EOSINOPHIL NFR BLD: 1.7 % (ref 0–6.9)
ERYTHROCYTE [DISTWIDTH] IN BLOOD BY AUTOMATED COUNT: 43.9 FL (ref 35.9–50)
GFR SERPLBLD CREATININE-BSD FMLA CKD-EPI: 127 ML/MIN/1.73 M 2
GLOBULIN SER CALC-MCNC: 3.1 G/DL (ref 1.9–3.5)
GLUCOSE SERPL-MCNC: 86 MG/DL (ref 65–99)
HCT VFR BLD AUTO: 32.9 % (ref 37–47)
HGB BLD-MCNC: 10.4 G/DL (ref 12–16)
IMM GRANULOCYTES # BLD AUTO: 0.06 K/UL (ref 0–0.11)
IMM GRANULOCYTES NFR BLD AUTO: 0.7 % (ref 0–0.9)
LYMPHOCYTES # BLD AUTO: 2.55 K/UL (ref 1–4.8)
LYMPHOCYTES NFR BLD: 30.5 % (ref 22–41)
MCH RBC QN AUTO: 26.5 PG (ref 27–33)
MCHC RBC AUTO-ENTMCNC: 31.6 G/DL (ref 32.2–35.5)
MCV RBC AUTO: 83.9 FL (ref 81.4–97.8)
MONOCYTES # BLD AUTO: 0.83 K/UL (ref 0–0.85)
MONOCYTES NFR BLD AUTO: 9.9 % (ref 0–13.4)
NEUTROPHILS # BLD AUTO: 4.77 K/UL (ref 1.82–7.42)
NEUTROPHILS NFR BLD: 57 % (ref 44–72)
NRBC # BLD AUTO: 0 K/UL
NRBC BLD-RTO: 0 /100 WBC (ref 0–0.2)
PLATELET # BLD AUTO: 346 K/UL (ref 164–446)
PMV BLD AUTO: 10.5 FL (ref 9–12.9)
POTASSIUM SERPL-SCNC: 3.7 MMOL/L (ref 3.6–5.5)
PROT SERPL-MCNC: 6.4 G/DL (ref 6–8.2)
RBC # BLD AUTO: 3.92 M/UL (ref 4.2–5.4)
SODIUM SERPL-SCNC: 133 MMOL/L (ref 135–145)
WBC # BLD AUTO: 8.4 K/UL (ref 4.8–10.8)

## 2024-09-11 PROCEDURE — 99283 EMERGENCY DEPT VISIT LOW MDM: CPT

## 2024-09-11 PROCEDURE — 85025 COMPLETE CBC W/AUTO DIFF WBC: CPT

## 2024-09-11 PROCEDURE — 80053 COMPREHEN METABOLIC PANEL: CPT

## 2024-09-11 PROCEDURE — 36415 COLL VENOUS BLD VENIPUNCTURE: CPT

## 2024-09-11 NOTE — ED TRIAGE NOTES
Pt presents by self from work for generalize itching ongoing for weeks. Report itching has spread to palms of hands and feed 3 days ago. Pt is 36w PRG. OBGYN is ute lora. Pt A&Ox4 and ambulatory.

## 2024-09-11 NOTE — ED PROVIDER NOTES
CHIEF COMPLAINT  Chief Complaint   Patient presents with    Itching       LIMITATION TO HISTORY   Select: None    HPI    Orquidea Lopez is a 24 y.o. female who presents to the Emergency Department presents for evaluation of diffuse pruritus which she states has been ongoing for the past 2 to 3 weeks.  Patient is 36 weeks pregnant G2, P1.  Stated that over the past couple weeks has had diffuse itching intermittently using Benadryl cream with improvement of her symptoms.  She is a sitter in the emergency department and while she was here she folic her hands began to itch prompting her to check into the emergency department.  She denies any chest pain, increasing shortness of breath, rashes,Abdominal pain vaginal bleeding or vaginal discharge    OUTSIDE HISTORIAN(S):  Select: None    EXTERNAL RECORDS REVIEWED  Select: Other office visit 7/19/2024 patient did have a CBC which showed a Hemoglobin of 10.5 office visit 12/8/2023, was evaluated for her annual exam diagnosed with postpartum depression      PAST MEDICAL HISTORY  History reviewed. No pertinent past medical history.  .    SURGICAL HISTORY  History reviewed. No pertinent surgical history.      FAMILY HISTORY  Family History   Problem Relation Age of Onset    Hypertension Father           SOCIAL HISTORY  Social History     Socioeconomic History    Marital status: Single     Spouse name: Not on file    Number of children: Not on file    Years of education: Not on file    Highest education level: Not on file   Occupational History    Not on file   Tobacco Use    Smoking status: Never    Smokeless tobacco: Never   Vaping Use    Vaping status: Never Used   Substance and Sexual Activity    Alcohol use: Never    Drug use: Never    Sexual activity: Yes     Partners: Male     Birth control/protection: Pill   Other Topics Concern    Not on file   Social History Narrative    Not on file     Social Determinants of Health     Financial Resource Strain: Not on file    Food Insecurity: Not on file   Transportation Needs: Not on file   Physical Activity: Not on file   Stress: Not on file   Social Connections: Not on file   Intimate Partner Violence: Not on file   Housing Stability: Not on file         CURRENT MEDICATIONS  No current facility-administered medications on file prior to encounter.     No current outpatient medications on file prior to encounter.           ALLERGIES  No Known Allergies    PHYSICAL EXAM  VITAL SIGNS:/57   Pulse 100   Temp 36.8 °C (98.2 °F) (Temporal)   Resp 14   Ht 1.524 m (5')   Wt 63.6 kg (140 lb 3.4 oz)   LMP 11/24/2023 (Exact Date)   SpO2 98%   BMI 27.38 kg/m²       VITALS - vital signs documented prior to this note have been reviewed and noted,  see EHR  GENERAL - awake and alert, no acute distress  HEENT - normocephalic, atraumatic, moist mucus membranes  CARDIOVASCULAR - regular rate and rhythm  PULMONARY - unlabored, no respiratory distress. No audible wheezing or  stridor.  NEUROLOGIC - mental status normal, speech fluid, cognition normal  MUSCULOSKELETAL -no obvious deformity or swelling  DERMATOLOGIC - warm and dry, no visible rashes  PSYCHIATRIC - normal affect, normal concentration      DIAGNOSTIC STUDIES / PROCEDURES    LABS  Labs Reviewed   CBC WITH DIFFERENTIAL - Abnormal; Notable for the following components:       Result Value    RBC 3.92 (*)     Hemoglobin 10.4 (*)     Hematocrit 32.9 (*)     MCH 26.5 (*)     MCHC 31.6 (*)     All other components within normal limits   COMP METABOLIC PANEL - Abnormal; Notable for the following components:    Sodium 133 (*)     Bun 7 (*)     Alkaline Phosphatase 170 (*)     All other components within normal limits   ESTIMATED GFR     Normal LFTs bilirubin normal and doubt cholestasis of pregnancy    RADIOLOGY  I have independently interpreted the diagnostic imaging associated with this visit and am waiting the final reading from the radiologist.   My preliminary interpretation is as  follows:     Point of Care Ultrasound    ED POINT OF CARE ULTRASOUND: LIMITED TRANSABDOMINAL PELVIC/OB    Indication for Exam: Confirm fetal viability    Intraunterine pregnancy Identified:Yes  Fetal Movement: yes  Fetal Heart Rate: 138      Image retained through [facility] as seen below:       Additional interpretation: Single live IUP    This study is a limited ultrasound examination performed and interpreted to evaluate for limited conditions as outlined above. There may be other clinically important information contained in the images that is outside this scope. When clinically warranted, a comprehensive ultrasound through the appropriate department is considered.       Radiologist interpretation:   No orders to display        COURSE & MEDICAL DECISION MAKING    ED COURSE:    ED Observation Status? no    INTERVENTIONS BY ME:  Medications - No data to display        INITIAL ASSESSMENT, COURSE AND PLAN  DDx hyperbilirubinemia cholistasis of pregnancy, Gilbert's syndrome allergies anaphylaxis    Care Narrative: Patient presented for evaluation of a diffuse pruritus which is been ongoing for the past a week.  Patient is 36 weeks pregnant.  No abdominal pain, no scleral icterus noted on examination.  Bedside ultrasound did confirm a single live IUP.  Did obtain labs which showed a mild hemoglobin though appears stable compared to prior.  CMP was without evidence of elevation in her liver enzymes, and a normal bilirubin.  Lowering concern for biliary etiology of her pruritus at this time.  On examination she has no signs or symptoms to suggest an allergic or anaphylactic reaction.  Recommended over-the-counter Benadryl and Pepcid as needed for pruritus.  Returning with any abdominal pain yellowing of the skin or eyes or other concerns.  Return precautions were discussed and she was discharged in stable condition.             ADDITIONAL PROBLEM LIST  Intrauterine pregnancy  DISPOSITION AND DISCUSSIONS      Decision tools  and prescription drugs considered including, but not limited to:  Over-the-counter Benadryl and Pepcid as needed .    FINAL DIAGNOSIS  1. Pruritus Acute   2. Anemia, unspecified type    3. Pregnancy, unspecified gestational age             Electronically signed by: Matthieu Huber DO5:44 PM 09/11/24

## 2024-09-12 NOTE — ED NOTES
Pt provided with DC paper work and follow up care. Pt declines questions and ambulated out of Er.

## 2024-09-27 ENCOUNTER — HOSPITAL ENCOUNTER (INPATIENT)
Facility: MEDICAL CENTER | Age: 24
LOS: 1 days | End: 2024-09-28
Attending: OBSTETRICS & GYNECOLOGY | Admitting: OBSTETRICS & GYNECOLOGY
Payer: COMMERCIAL

## 2024-09-27 DIAGNOSIS — B00.1 COLD SORE: ICD-10-CM

## 2024-09-27 LAB
ANISOCYTOSIS BLD QL SMEAR: ABNORMAL
BASOPHILS # BLD AUTO: 0 % (ref 0–1.8)
BASOPHILS # BLD: 0 K/UL (ref 0–0.12)
EOSINOPHIL # BLD AUTO: 0.12 K/UL (ref 0–0.51)
EOSINOPHIL NFR BLD: 0.9 % (ref 0–6.9)
ERYTHROCYTE [DISTWIDTH] IN BLOOD BY AUTOMATED COUNT: 44.1 FL (ref 35.9–50)
ERYTHROCYTE [DISTWIDTH] IN BLOOD BY AUTOMATED COUNT: 44.3 FL (ref 35.9–50)
HCT VFR BLD AUTO: 32.8 % (ref 37–47)
HCT VFR BLD AUTO: 35 % (ref 37–47)
HGB BLD-MCNC: 10.1 G/DL (ref 12–16)
HGB BLD-MCNC: 11.1 G/DL (ref 12–16)
HOLDING TUBE BB 8507: NORMAL
LYMPHOCYTES # BLD AUTO: 2.86 K/UL (ref 1–4.8)
LYMPHOCYTES NFR BLD: 21.2 % (ref 22–41)
MANUAL DIFF BLD: NORMAL
MCH RBC QN AUTO: 25 PG (ref 27–33)
MCH RBC QN AUTO: 25.5 PG (ref 27–33)
MCHC RBC AUTO-ENTMCNC: 30.8 G/DL (ref 32.2–35.5)
MCHC RBC AUTO-ENTMCNC: 31.7 G/DL (ref 32.2–35.5)
MCV RBC AUTO: 80.5 FL (ref 81.4–97.8)
MCV RBC AUTO: 81.2 FL (ref 81.4–97.8)
MICROCYTES BLD QL SMEAR: ABNORMAL
MONOCYTES # BLD AUTO: 0.59 K/UL (ref 0–0.85)
MONOCYTES NFR BLD AUTO: 4.4 % (ref 0–13.4)
MORPHOLOGY BLD-IMP: NORMAL
NEUTROPHILS # BLD AUTO: 9.92 K/UL (ref 1.82–7.42)
NEUTROPHILS NFR BLD: 73.5 % (ref 44–72)
NRBC # BLD AUTO: 0 K/UL
NRBC BLD-RTO: 0 /100 WBC (ref 0–0.2)
PLATELET # BLD AUTO: 288 K/UL (ref 164–446)
PLATELET # BLD AUTO: 369 K/UL (ref 164–446)
PLATELET BLD QL SMEAR: NORMAL
PMV BLD AUTO: 11.1 FL (ref 9–12.9)
PMV BLD AUTO: 11.1 FL (ref 9–12.9)
RBC # BLD AUTO: 4.04 M/UL (ref 4.2–5.4)
RBC # BLD AUTO: 4.35 M/UL (ref 4.2–5.4)
RBC BLD AUTO: PRESENT
T PALLIDUM AB SER QL IA: NORMAL
WBC # BLD AUTO: 13.5 K/UL (ref 4.8–10.8)
WBC # BLD AUTO: 15.6 K/UL (ref 4.8–10.8)

## 2024-09-27 PROCEDURE — 85007 BL SMEAR W/DIFF WBC COUNT: CPT

## 2024-09-27 PROCEDURE — 770002 HCHG ROOM/CARE - OB PRIVATE (112)

## 2024-09-27 PROCEDURE — 86780 TREPONEMA PALLIDUM: CPT

## 2024-09-27 PROCEDURE — 700102 HCHG RX REV CODE 250 W/ 637 OVERRIDE(OP): Performed by: OBSTETRICS & GYNECOLOGY

## 2024-09-27 PROCEDURE — A9270 NON-COVERED ITEM OR SERVICE: HCPCS | Performed by: OBSTETRICS & GYNECOLOGY

## 2024-09-27 PROCEDURE — 36415 COLL VENOUS BLD VENIPUNCTURE: CPT

## 2024-09-27 PROCEDURE — 700111 HCHG RX REV CODE 636 W/ 250 OVERRIDE (IP)

## 2024-09-27 PROCEDURE — 59409 OBSTETRICAL CARE: CPT

## 2024-09-27 PROCEDURE — 304965 HCHG RECOVERY SERVICES

## 2024-09-27 PROCEDURE — 700105 HCHG RX REV CODE 258: Performed by: OBSTETRICS & GYNECOLOGY

## 2024-09-27 PROCEDURE — 85027 COMPLETE CBC AUTOMATED: CPT

## 2024-09-27 PROCEDURE — 700111 HCHG RX REV CODE 636 W/ 250 OVERRIDE (IP): Mod: JZ | Performed by: OBSTETRICS & GYNECOLOGY

## 2024-09-27 PROCEDURE — 700101 HCHG RX REV CODE 250: Performed by: OBSTETRICS & GYNECOLOGY

## 2024-09-27 PROCEDURE — 0HQ9XZZ REPAIR PERINEUM SKIN, EXTERNAL APPROACH: ICD-10-PCS | Performed by: OBSTETRICS & GYNECOLOGY

## 2024-09-27 RX ORDER — SODIUM CHLORIDE, SODIUM LACTATE, POTASSIUM CHLORIDE, CALCIUM CHLORIDE 600; 310; 30; 20 MG/100ML; MG/100ML; MG/100ML; MG/100ML
INJECTION, SOLUTION INTRAVENOUS PRN
Status: DISCONTINUED | OUTPATIENT
Start: 2024-09-27 | End: 2024-09-28 | Stop reason: HOSPADM

## 2024-09-27 RX ORDER — DOCUSATE SODIUM 100 MG/1
100 CAPSULE, LIQUID FILLED ORAL 2 TIMES DAILY PRN
Status: DISCONTINUED | OUTPATIENT
Start: 2024-09-27 | End: 2024-09-28 | Stop reason: HOSPADM

## 2024-09-27 RX ORDER — OXYTOCIN 10 [USP'U]/ML
10 INJECTION, SOLUTION INTRAMUSCULAR; INTRAVENOUS
Status: DISCONTINUED | OUTPATIENT
Start: 2024-09-27 | End: 2024-09-27 | Stop reason: HOSPADM

## 2024-09-27 RX ORDER — SIMETHICONE 125 MG
125 TABLET,CHEWABLE ORAL 4 TIMES DAILY PRN
Status: DISCONTINUED | OUTPATIENT
Start: 2024-09-27 | End: 2024-09-28 | Stop reason: HOSPADM

## 2024-09-27 RX ORDER — IBUPROFEN 800 MG/1
800 TABLET, FILM COATED ORAL
Status: DISCONTINUED | OUTPATIENT
Start: 2024-09-27 | End: 2024-09-27 | Stop reason: HOSPADM

## 2024-09-27 RX ORDER — OXYCODONE HYDROCHLORIDE 5 MG/1
5 TABLET ORAL EVERY 4 HOURS PRN
Status: DISCONTINUED | OUTPATIENT
Start: 2024-09-27 | End: 2024-09-28 | Stop reason: HOSPADM

## 2024-09-27 RX ORDER — SODIUM CHLORIDE, SODIUM LACTATE, POTASSIUM CHLORIDE, CALCIUM CHLORIDE 600; 310; 30; 20 MG/100ML; MG/100ML; MG/100ML; MG/100ML
INJECTION, SOLUTION INTRAVENOUS CONTINUOUS
Status: DISCONTINUED | OUTPATIENT
Start: 2024-09-27 | End: 2024-09-28 | Stop reason: HOSPADM

## 2024-09-27 RX ORDER — ACETAMINOPHEN 500 MG
1000 TABLET ORAL EVERY 6 HOURS PRN
Status: DISCONTINUED | OUTPATIENT
Start: 2024-09-27 | End: 2024-09-28 | Stop reason: HOSPADM

## 2024-09-27 RX ORDER — IBUPROFEN 800 MG/1
800 TABLET, FILM COATED ORAL EVERY 8 HOURS PRN
Status: DISCONTINUED | OUTPATIENT
Start: 2024-09-27 | End: 2024-09-28 | Stop reason: HOSPADM

## 2024-09-27 RX ORDER — LIDOCAINE HYDROCHLORIDE 10 MG/ML
20 INJECTION, SOLUTION INFILTRATION; PERINEURAL
Status: COMPLETED | OUTPATIENT
Start: 2024-09-27 | End: 2024-09-27

## 2024-09-27 RX ORDER — MISOPROSTOL 200 UG/1
600 TABLET ORAL
Status: DISCONTINUED | OUTPATIENT
Start: 2024-09-27 | End: 2024-09-28 | Stop reason: HOSPADM

## 2024-09-27 RX ORDER — ACETAMINOPHEN 500 MG
1000 TABLET ORAL
Status: DISCONTINUED | OUTPATIENT
Start: 2024-09-27 | End: 2024-09-27 | Stop reason: HOSPADM

## 2024-09-27 RX ORDER — VALACYCLOVIR HYDROCHLORIDE 500 MG/1
500 TABLET, FILM COATED ORAL 2 TIMES DAILY
Status: DISCONTINUED | OUTPATIENT
Start: 2024-09-27 | End: 2024-09-28 | Stop reason: HOSPADM

## 2024-09-27 RX ORDER — TERBUTALINE SULFATE 1 MG/ML
0.25 INJECTION, SOLUTION SUBCUTANEOUS
Status: DISCONTINUED | OUTPATIENT
Start: 2024-09-27 | End: 2024-09-27 | Stop reason: HOSPADM

## 2024-09-27 RX ADMIN — LIDOCAINE HYDROCHLORIDE 20 ML: 10 INJECTION, SOLUTION INFILTRATION; PERINEURAL at 03:13

## 2024-09-27 RX ADMIN — VALACYCLOVIR 500 MG: 500 TABLET, FILM COATED ORAL at 20:08

## 2024-09-27 RX ADMIN — OXYTOCIN 125 ML/HR: 10 INJECTION, SOLUTION INTRAMUSCULAR; INTRAVENOUS at 05:17

## 2024-09-27 RX ADMIN — IBUPROFEN 800 MG: 800 TABLET, FILM COATED ORAL at 07:37

## 2024-09-27 RX ADMIN — OXYTOCIN 20 UNITS: 10 INJECTION, SOLUTION INTRAMUSCULAR; INTRAVENOUS at 03:15

## 2024-09-27 ASSESSMENT — EDINBURGH POSTNATAL DEPRESSION SCALE (EPDS)
I HAVE FELT SCARED OR PANICKY FOR NO GOOD REASON: NO, NOT AT ALL
I HAVE BEEN SO UNHAPPY THAT I HAVE BEEN CRYING: NO, NEVER
I HAVE BEEN ABLE TO LAUGH AND SEE THE FUNNY SIDE OF THINGS: AS MUCH AS I ALWAYS COULD
I HAVE LOOKED FORWARD WITH ENJOYMENT TO THINGS: AS MUCH AS I EVER DID
I HAVE BEEN ANXIOUS OR WORRIED FOR NO GOOD REASON: NO, NOT AT ALL
THE THOUGHT OF HARMING MYSELF HAS OCCURRED TO ME: NEVER
I HAVE BEEN SO UNHAPPY THAT I HAVE HAD DIFFICULTY SLEEPING: NOT AT ALL
THINGS HAVE BEEN GETTING ON TOP OF ME: NO, I HAVE BEEN COPING AS WELL AS EVER
I HAVE BLAMED MYSELF UNNECESSARILY WHEN THINGS WENT WRONG: YES, SOME OF THE TIME
I HAVE FELT SAD OR MISERABLE: NO, NOT AT ALL

## 2024-09-27 ASSESSMENT — FIBROSIS 4 INDEX: FIB4 SCORE: 0.36

## 2024-09-27 ASSESSMENT — PAIN DESCRIPTION - PAIN TYPE
TYPE: ACUTE PAIN

## 2024-09-27 NOTE — LACTATION NOTE
This note was copied from a baby's chart.  Initial Visit:    38w1d infant delivered vaginally to a  mother 24 at 0311    Recently documented latch score 8    Feeding Plan: Breastfeeding    Breastfeeding History: Breast fed her first baby for 5 months. After she went back to work, her milk supply dropped.    Medical History: No significant breast feeding risk factors noted     Mom reports breast feeding is going well, baby latched following delivery. She would like to try again, LC offered assistance and mom agrees.     Bilateral breast assessment WNL, nipples everted, no damage noted.    Baby placed skin to skin in cross cradle position on the right side. LC assisted with proper positioning, breast wedging and asymmetrical latch technique. When presented the nipple, baby opened mouth with a wide gape and latched deeply to the breast. Organized and nutritive suck pattern noted, and pointed out to mom. Audible swallows noted.     Baby noted to be spitty, spot checked O2 sat which was 100% sustained for several minutes.     Breast feeding education provided: Education provided regarding the milk making process and supply in demand. Frequent skin to skin encouraged. Encouraged MOB to offer the breast to baby any time she is showing hunger cues (cues reviewed). Encouraged MOB to allow baby to self limit at the breast. Anticipatory guidance provided regarding typical  feeding behaviors in the first 24-48hrs, including cluster feeding. Proper positioning and latch technique verbalized. Education provided regarding the importance of achieving a deep latch with each feeding to ensure proper stimulation, milk transfer, and reduce the chance for nipple damage/pain.     Plan: Continue offering the breast to baby on cue, a minimum of 8 times every 24hrs. Skin to skin for each feeding. Hand expression and feed back colostrum (with RN assistance) if baby due to feed, but too sleepy or unable to latch. Do not limit  baby's time at the breast. Reach out to RN/LC if experiencing pain with latch or if unable to latch baby independently. Orquidea would like to enroll with WIC, LC will fax WIC referral and provided mom with pamphlet with contact information. LC also provided HTH pump packet and instructions on how to  HTH breast pump.

## 2024-09-27 NOTE — DISCHARGE PLANNING
Discharge Planning Assessment Post Partum    Reason for Referral: History of depression and PPD  Address: [address] [place] [address] [place] [address]  Phone: [phone]  Type of Living Situation: stable housing   Mom Diagnosis: Pregnancy, vaginal delivery   Baby Diagnosis: [place]-38.1 weeks   Primary Language: English     Name of Baby: Still deciding on her name (: 24)  Father of the Baby: Hugo Mary   Involved in baby’s care? Yes  Contact Information: [phone]    Prenatal Care:  Yes-Dr. Lala   Mom's PCP: SANKET Beltran  PCP for new baby: MARGOT Red[place]     Support System: FOB  Coping/Bonding between mother & baby: Yes  Source of Feeding: breast feeding   Supplies for Infant: prepared for infant     Mom's Insurance: [employer] and [employer]   Baby Covered on Insurance: Yes  Mother Employed/School: [employer]   Other children in the home/names & ages: 17 month old son-Herbert     Financial Hardship/Income: No   Mom's Mental status: alert and oriented   Services used prior to admit: Medicaid and plans to apply for [facility]     CPS History: No  Psychiatric History: History of depression and PPD.  Discussed with mother and provided PPD resources.  Domestic Violence History: No  Drug/ETOH History: No    Resources Provided: children and family resource list, post partum support and counseling resources, diaper bank assistance resources, and list of [facility] clinics   Referrals Made: diaper bank referrals provided      Clearance for Discharge: Infant is cleared to discharge home with parents once medically cleared

## 2024-09-27 NOTE — CARE PLAN
The patient is Stable - Low risk of patient condition declining or worsening         Progress made toward(s) clinical / shift goals:  Progressing    Problem: Knowledge Deficit - L&D  Goal: Patient and family/caregivers will demonstrate understanding of plan of care, disease process/condition, diagnostic tests and medications  Outcome: Progressing   Pt continually updated and educated on ongoing POC       Problem: Pain  Goal: Patient's pain will be alleviated or reduced to the patient’s comfort goal  Outcome: Progressing   Pt states she is experiencing adequate pain control     Patient is not progressing towards the following goals:

## 2024-09-27 NOTE — L&D DELIVERY NOTE
DATE OF SERVICE:  09/27/2024     This is a private patient of Dr. Lala.  She underwent normal spontaneous   vaginal delivery of a viable infant over an intact perineum without epidural   placement.  Spontaneous vaginal delivery of the head in controlled sterile   fashion.  No nuchal cord was noted.  The rest of the infant delivered through   uncomplicated.  Cord was held for 30 seconds, then clamped x2 and cut and   infant was placed on mother's chest.  Spontaneous vaginal delivery of intact   placenta with 3-vessel cord.  A small first-degree perineal laceration was   repaired using 3-0 Vicryl.  Fundus was firm with Pitocin and massage.  Total   estimated blood loss was approximately 200 mL. Apgars of the infant were 8 and   9.  Mother and infant were both stable at the end of delivery.        ______________________________  Corinne E. Capurro, MD [facility]    DD:  09/27/2024 03:49  DT:  09/27/2024 04:30    Job#:  [id number]

## 2024-09-27 NOTE — H&P
DATE OF ADMISSION:  2024     Please note, I do not have the prenatal chart available in front of me.     This is a private patient of Dr. Giordano.     CHIEF COMPLAINT:  Uterine contractions.     HISTORY OF PRESENT ILLNESS:  The patient is a 24-year-old  2, para 1 at   38 and 1/7 weeks' gestational age.  I do not have her prenatal chart in front   of me.  Came in complaining of contractions and was found to be 8 cm and   delivered within 30 minutes of presenting to the hospital.  She states her   pregnancy has been uncomplicated.     I believe her social, surgical and medical histories are negative.       She does not have any known allergies that I am aware of.     CURRENT MEDICATIONS:  Include prenatal vitamin.     LABORATORY DATA:  Shows a blood type of O positive.  I do not have group B   strep results at this time.     PHYSICAL EXAMINATION:    VITAL SIGNS:  Stable on admission.  CARDIOVASCULAR:  Regular rate and rhythm.  LUNGS:  Clear.  ABDOMEN:  Gravid.  PELVIC:  Vaginal exam completely dilated and the patient pushing.  Fetal heart   tones 140s and category 1 reactive.     ASSESSMENT AND PLAN:  This is an intrauterine pregnancy at 38 and 1/7 weeks   delivery immediately after presentation to the hospital.  Please see her   separate dictated vaginal delivery note.        ______________________________  Corinne E. Capurro, MD [facility]    DD:  2024 03:48  DT:  2024 04:20    Job#:  [id number]

## 2024-09-27 NOTE — PROGRESS NOTES
0300 Report from Dior Bhatia RN. POC discussed. Pt states she feeling pressure.   0304 Dr. Rob to bedside. SVE complete.  0311 Spontaneous vaginal delivery of viable female infant. APGARS 8/9.   0545 Report to Charlotte FERNANDES. Care Relinquished.

## 2024-09-28 ENCOUNTER — PHARMACY VISIT (OUTPATIENT)
Dept: PHARMACY | Facility: MEDICAL CENTER | Age: 24
End: 2024-09-28
Payer: COMMERCIAL

## 2024-09-28 VITALS
BODY MASS INDEX: 25.91 KG/M2 | WEIGHT: 132 LBS | OXYGEN SATURATION: 97 % | SYSTOLIC BLOOD PRESSURE: 105 MMHG | RESPIRATION RATE: 17 BRPM | HEIGHT: 60 IN | DIASTOLIC BLOOD PRESSURE: 69 MMHG | TEMPERATURE: 98.7 F | HEART RATE: 82 BPM

## 2024-09-28 PROCEDURE — 700102 HCHG RX REV CODE 250 W/ 637 OVERRIDE(OP): Performed by: OBSTETRICS & GYNECOLOGY

## 2024-09-28 PROCEDURE — 90656 IIV3 VACC NO PRSV 0.5 ML IM: CPT | Performed by: OBSTETRICS & GYNECOLOGY

## 2024-09-28 PROCEDURE — RXMED WILLOW AMBULATORY MEDICATION CHARGE: Performed by: OBSTETRICS & GYNECOLOGY

## 2024-09-28 PROCEDURE — 700111 HCHG RX REV CODE 636 W/ 250 OVERRIDE (IP): Performed by: OBSTETRICS & GYNECOLOGY

## 2024-09-28 PROCEDURE — 90471 IMMUNIZATION ADMIN: CPT

## 2024-09-28 PROCEDURE — A9270 NON-COVERED ITEM OR SERVICE: HCPCS | Performed by: OBSTETRICS & GYNECOLOGY

## 2024-09-28 RX ORDER — VITAMIN A ACETATE, BETA CAROTENE, ASCORBIC ACID, CHOLECALCIFEROL, .ALPHA.-TOCOPHEROL ACETATE, DL-, THIAMINE MONONITRATE, RIBOFLAVIN, NIACINAMIDE, PYRIDOXINE HYDROCHLORIDE, FOLIC ACID, CYANOCOBALAMIN, CALCIUM CARBONATE, FERROUS FUMARATE, ZINC OXIDE, CUPRIC OXIDE 3080; 12; 120; 400; 1; 1.84; 3; 20; 22; 920; 25; 200; 27; 10; 2 [IU]/1; UG/1; MG/1; [IU]/1; MG/1; MG/1; MG/1; MG/1; MG/1; [IU]/1; MG/1; MG/1; MG/1; MG/1; MG/1
1 TABLET, FILM COATED ORAL DAILY
Qty: 30 TABLET | Refills: 0 | Status: SHIPPED | OUTPATIENT
Start: 2024-09-28

## 2024-09-28 RX ORDER — PSEUDOEPHEDRINE HCL 30 MG
100 TABLET ORAL 2 TIMES DAILY PRN
Qty: 60 CAPSULE | Refills: 0 | Status: SHIPPED | OUTPATIENT
Start: 2024-09-28

## 2024-09-28 RX ORDER — VITAMIN A ACETATE, BETA CAROTENE, ASCORBIC ACID, CHOLECALCIFEROL, .ALPHA.-TOCOPHEROL ACETATE, DL-, THIAMINE MONONITRATE, RIBOFLAVIN, NIACINAMIDE, PYRIDOXINE HYDROCHLORIDE, FOLIC ACID, CYANOCOBALAMIN, CALCIUM CARBONATE, FERROUS FUMARATE, ZINC OXIDE, CUPRIC OXIDE 3080; 12; 120; 400; 1; 1.84; 3; 20; 22; 920; 25; 200; 27; 10; 2 [IU]/1; UG/1; MG/1; [IU]/1; MG/1; MG/1; MG/1; MG/1; MG/1; [IU]/1; MG/1; MG/1; MG/1; MG/1; MG/1
1 TABLET, FILM COATED ORAL
Status: DISCONTINUED | OUTPATIENT
Start: 2024-09-28 | End: 2024-09-28 | Stop reason: HOSPADM

## 2024-09-28 RX ORDER — VALACYCLOVIR HYDROCHLORIDE 500 MG/1
500 TABLET, FILM COATED ORAL 2 TIMES DAILY PRN
Qty: 20 TABLET | Refills: 0 | Status: ACTIVE | OUTPATIENT
Start: 2024-09-28

## 2024-09-28 RX ORDER — IBUPROFEN 800 MG/1
800 TABLET, FILM COATED ORAL EVERY 8 HOURS PRN
Qty: 30 TABLET | Refills: 0 | Status: SHIPPED | OUTPATIENT
Start: 2024-09-28

## 2024-09-28 RX ADMIN — INFLUENZA A VIRUS A/VICTORIA/4897/2022 IVR-238 (H1N1) ANTIGEN (FORMALDEHYDE INACTIVATED), INFLUENZA A VIRUS A/CALIFORNIA/122/2022 SAN-022 (H3N2) ANTIGEN (FORMALDEHYDE INACTIVATED), AND INFLUENZA B VIRUS B/MICHIGAN/01/2021 ANTIGEN (FORMALDEHYDE INACTIVATED) 0.5 ML: 15; 15; 15 INJECTION, SUSPENSION INTRAMUSCULAR at 12:19

## 2024-09-28 RX ADMIN — IBUPROFEN 800 MG: 800 TABLET, FILM COATED ORAL at 01:01

## 2024-09-28 RX ADMIN — PRENATAL WITH FERROUS FUM AND FOLIC ACID 1 TABLET: 3080; 920; 120; 400; 22; 1.84; 3; 20; 10; 1; 12; 200; 27; 25; 2 TABLET ORAL at 06:25

## 2024-09-28 RX ADMIN — VALACYCLOVIR 500 MG: 500 TABLET, FILM COATED ORAL at 06:25

## 2024-09-28 ASSESSMENT — PAIN DESCRIPTION - PAIN TYPE
TYPE: ACUTE PAIN

## 2024-09-28 NOTE — PROGRESS NOTES
Assessment complete, pt resting comfortably in bed at this time. Fundus firm, lochia light. Pt declines pain medications at this time; heat packs provided for cramps during breastfeeding; pt understands to call for meds if needed. Pt states voiding without difficulty. POC discussed. Flu vaccine offered to patient, pt states she requested to have it administered tomorrow. Call light in reach of pt, encouraged to call for assistance.

## 2024-09-28 NOTE — PROGRESS NOTES
"Patient reports top right lip \"tingling like when I usually get a cold sore\", uses topical medication at home and is requesting contacting MD for possible medication to decrease exacerbation if possible. Message to MD via Voalte awaiting return message.   "

## 2024-09-28 NOTE — CARE PLAN
The patient is Stable - Low risk of patient condition declining or worsening    Shift Goals  Clinical Goals: Pain control, lochia remain WDL, VSS  Patient Goals: bonding  Family Goals: bonding as a family    Progress made toward(s) clinical / shift goals:  vs wnl; pain controlled with heat packs while breastfeeding; lochia light.    Patient is not progressing towards the following goals: n/a

## 2024-09-28 NOTE — DISCHARGE SUMMARY
Discharge Summary:     Date of Admission: 2024  Date of Discharge: 24      Admitting diagnosis:    1. Pregnancy @ 38w1d  2. Spontaneous labor      Discharge Diagnosis:   1. Status post .      PMHx: none    OB History    Para Term  AB Living   2 2 2     2   SAB IAB Ectopic Molar Multiple Live Births           0 2      # Outcome Date GA Lbr Júnior/2nd Weight Sex Type Anes PTL Lv   2 Term 24 38w1d / 00:07 3.26 kg (7 lb 3 oz) F Vag-Spont Local N YESENIA   1 Term 23 39w3d / 03:32 3.52 kg (7 lb 12.2 oz) M Vag-Spont None N YESENIA     PSHx: none  Allergies: Patient has no known allergies.    Hospital Course:   Pt is a 24 y.o. now  who presented for contractions and was found to be complete and pushing on arrival.  She underwent spontaneous vaginal delivery, giving birth to a viable female infant with APGARs of 8 and 9, weight 3260g.  The pt's postpartum course was unremarkable, she remained afebrile with normal vital signs. On PPD 1 the pt felt ready for discharge. On day of discharge pt was ambulating, voiding spontaneously, tolerating PO, with adequate pain control, and desiring to go home.    Physical Exam:  Temp:  [36.2 °C (97.2 °F)-37.3 °C (99.2 °F)] 37 °C (98.6 °F)  Pulse:  [] 89  Resp:  [17-18] 17  BP: (103-117)/(63-79) 103/63  SpO2:  [96 %-98 %] 98 %  Gen: AAO, NAD  Abd: soft, NT, ND, fundus firm below umbilicus  Ext: NT, no edema bilaterally    Current Facility-Administered Medications   Medication Dose    prenatal plus vitamin (Stuartnatal 1+1) 27-1 MG tablet 1 Tablet  1 Tablet    LR infusion      oxytocin (Pitocin) infusion (for post delivery)  125 mL/hr    lactated ringers infusion      docusate sodium (Colace) capsule 100 mg  100 mg    ibuprofen (Motrin) tablet 800 mg  800 mg    acetaminophen (Tylenol) tablet 1,000 mg  1,000 mg    miSOPROStol (Cytotec) tablet 600 mcg  600 mcg    oxyCODONE immediate-release (Roxicodone) tablet 5 mg  5 mg    simethicone (Mylicon)  chewable tablet 125 mg  125 mg    valACYclovir (Valtrex) caplet 500 mg  500 mg       Recent Labs     09/27/24  0300 09/27/24  1144   WBC 13.5* 15.6*   RBC 4.35 4.04*   HEMOGLOBIN 11.1* 10.1*   HEMATOCRIT 35.0* 32.8*   MCV 80.5* 81.2*   MCH 25.5* 25.0*   MCHC 31.7* 30.8*   RDW 44.3 44.1   PLATELETCT 369 288   MPV 11.1 11.1         Activity/ Discharge Instructions::   Discharge to home  Pelvic Rest x 6 weeks  No heavy lifting x4 weeks  Call or come to ED for: heavy vaginal bleeding, fever >100.4, severe abdominal pain, severe headache, chest pain, shortness of breath,  N/V, abnormal vaginal discharge, or other concerns.       Follow up: , 5-6wks for PP visit with primary OB    Discharge Meds:      Medication List        START taking these medications        Instructions   docusate sodium 100 MG Caps   Take 100 mg by mouth 2 times a day as needed for Constipation.  Dose: 100 mg     ibuprofen 800 MG Tabs  Commonly known as: Motrin   Take 1 Tablet by mouth every 8 hours as needed for Moderate Pain.  Dose: 800 mg     prenatal plus vitamin 27-1 MG Tabs tablet   Take 1 Tablet by mouth every day.  Dose: 1 Tablet     valACYclovir 500 MG Tabs  Commonly known as: Valtrex   Take 1 Tablet by mouth 2 times a day as needed (for 3 days with outbreaks).  Dose: 500 mg                 Alecia Sheffield MD  [facility]

## 2024-09-28 NOTE — DISCHARGE INSTRUCTIONS

## 2024-09-28 NOTE — LACTATION NOTE
Followup isit  MOB is P2.  .  Wt loss today -2.9%  MOB reports baby began clusterfeeding around 4 this morning. She reports nipples are tender, using lanolin for soothing. Nipple intact bilaterally to visualization.  MOB reports baby tucks her lips at breast. Baby crying and rooting. MOB offers left breast in cross cradle hold. Baby independently latches, lips tucked. MOB taught how to unfurl lips and she reports more comfort with suckling. Intermittent swallows observed and MOB taught how to see swallows.  Discussed normal  feeding frequency of first 6-8 weeks, cluster feeding, growth spurts and stool changes expected by day 5.  Suck assessed, WNL. Tongue lifts to roof of mouth with cry. Upper lip frenum thick, but lip flexes to nares without blanching of lip or gums.  Parents taught gentle massage of outer lip area to release tension in lips that may help baby flange lips better at breast.   MOB reports she knows how to hand express. Discussed using hand expression if she becomes uncomfortably engorged  Reviewed post d/c bfdg resources.

## 2024-09-29 NOTE — PROGRESS NOTES
Approximately 1715 discharged home reviewed all discharge instructions and follow up needs, bands checks, cuddles removed, car seat check, without further questions or concerns.

## 2024-11-21 ENCOUNTER — EH NON-PROVIDER (OUTPATIENT)
Dept: OCCUPATIONAL MEDICINE | Facility: CLINIC | Age: 24
End: 2024-11-21

## 2024-11-21 DIAGNOSIS — Z02.89 ENCOUNTER FOR OCCUPATIONAL HEALTH EXAMINATION INVOLVING RESPIRATOR: ICD-10-CM

## 2024-11-21 PROCEDURE — 94375 RESPIRATORY FLOW VOLUME LOOP: CPT | Performed by: NURSE PRACTITIONER

## 2025-04-24 ENCOUNTER — EH NON-PROVIDER (OUTPATIENT)
Dept: OCCUPATIONAL MEDICINE | Facility: CLINIC | Age: 25
End: 2025-04-24

## 2025-04-24 DIAGNOSIS — Z02.89 ENCOUNTER FOR OCCUPATIONAL HEALTH ASSESSMENT: ICD-10-CM

## 2025-04-24 PROCEDURE — 94375 RESPIRATORY FLOW VOLUME LOOP: CPT | Performed by: PREVENTIVE MEDICINE
